# Patient Record
Sex: MALE | Race: OTHER | HISPANIC OR LATINO | URBAN - METROPOLITAN AREA
[De-identification: names, ages, dates, MRNs, and addresses within clinical notes are randomized per-mention and may not be internally consistent; named-entity substitution may affect disease eponyms.]

---

## 2020-03-21 ENCOUNTER — INPATIENT (INPATIENT)
Facility: HOSPITAL | Age: 55
LOS: 10 days | Discharge: HOME | End: 2020-04-01
Attending: HOSPITALIST | Admitting: HOSPITALIST
Payer: MEDICARE

## 2020-03-21 VITALS
DIASTOLIC BLOOD PRESSURE: 52 MMHG | OXYGEN SATURATION: 95 % | RESPIRATION RATE: 20 BRPM | SYSTOLIC BLOOD PRESSURE: 92 MMHG | TEMPERATURE: 99 F | HEART RATE: 113 BPM

## 2020-03-21 DIAGNOSIS — Z02.9 ENCOUNTER FOR ADMINISTRATIVE EXAMINATIONS, UNSPECIFIED: ICD-10-CM

## 2020-03-21 DIAGNOSIS — J06.9 ACUTE UPPER RESPIRATORY INFECTION, UNSPECIFIED: ICD-10-CM

## 2020-03-21 DIAGNOSIS — Z29.9 ENCOUNTER FOR PROPHYLACTIC MEASURES, UNSPECIFIED: ICD-10-CM

## 2020-03-21 DIAGNOSIS — A41.9 SEPSIS, UNSPECIFIED ORGANISM: ICD-10-CM

## 2020-03-21 LAB
ALBUMIN SERPL ELPH-MCNC: 3.8 G/DL — SIGNIFICANT CHANGE UP (ref 3.3–5)
ALP SERPL-CCNC: 77 U/L — SIGNIFICANT CHANGE UP (ref 40–120)
ALT FLD-CCNC: 27 U/L — SIGNIFICANT CHANGE UP (ref 4–41)
ANION GAP SERPL CALC-SCNC: 13 MMO/L — SIGNIFICANT CHANGE UP (ref 7–14)
APPEARANCE UR: CLEAR — SIGNIFICANT CHANGE UP
APTT BLD: 34.6 SEC — SIGNIFICANT CHANGE UP (ref 27.5–36.3)
AST SERPL-CCNC: 31 U/L — SIGNIFICANT CHANGE UP (ref 4–40)
B PERT DNA SPEC QL NAA+PROBE: NOT DETECTED — SIGNIFICANT CHANGE UP
BACTERIA # UR AUTO: NEGATIVE — SIGNIFICANT CHANGE UP
BASE EXCESS BLDV CALC-SCNC: 1.9 MMOL/L — SIGNIFICANT CHANGE UP
BASOPHILS # BLD AUTO: 0.01 K/UL — SIGNIFICANT CHANGE UP (ref 0–0.2)
BASOPHILS NFR BLD AUTO: 0.1 % — SIGNIFICANT CHANGE UP (ref 0–2)
BILIRUB SERPL-MCNC: 0.7 MG/DL — SIGNIFICANT CHANGE UP (ref 0.2–1.2)
BILIRUB UR-MCNC: NEGATIVE — SIGNIFICANT CHANGE UP
BLOOD GAS VENOUS - CREATININE: 0.92 MG/DL — SIGNIFICANT CHANGE UP (ref 0.5–1.3)
BLOOD UR QL VISUAL: SIGNIFICANT CHANGE UP
BUN SERPL-MCNC: 10 MG/DL — SIGNIFICANT CHANGE UP (ref 7–23)
C PNEUM DNA SPEC QL NAA+PROBE: NOT DETECTED — SIGNIFICANT CHANGE UP
CALCIUM SERPL-MCNC: 8.7 MG/DL — SIGNIFICANT CHANGE UP (ref 8.4–10.5)
CHLORIDE BLDV-SCNC: 98 MMOL/L — SIGNIFICANT CHANGE UP (ref 96–108)
CHLORIDE SERPL-SCNC: 97 MMOL/L — LOW (ref 98–107)
CO2 SERPL-SCNC: 24 MMOL/L — SIGNIFICANT CHANGE UP (ref 22–31)
COLOR SPEC: YELLOW — SIGNIFICANT CHANGE UP
CREAT SERPL-MCNC: 0.89 MG/DL — SIGNIFICANT CHANGE UP (ref 0.5–1.3)
D DIMER BLD IA.RAPID-MCNC: 177 NG/ML — SIGNIFICANT CHANGE UP
EOSINOPHIL # BLD AUTO: 0 K/UL — SIGNIFICANT CHANGE UP (ref 0–0.5)
EOSINOPHIL NFR BLD AUTO: 0 % — SIGNIFICANT CHANGE UP (ref 0–6)
ERYTHROCYTE [SEDIMENTATION RATE] IN BLOOD: SIGNIFICANT CHANGE UP MM/HR (ref 1–15)
FLUAV H1 2009 PAND RNA SPEC QL NAA+PROBE: NOT DETECTED — SIGNIFICANT CHANGE UP
FLUAV H1 RNA SPEC QL NAA+PROBE: NOT DETECTED — SIGNIFICANT CHANGE UP
FLUAV H3 RNA SPEC QL NAA+PROBE: NOT DETECTED — SIGNIFICANT CHANGE UP
FLUAV SUBTYP SPEC NAA+PROBE: NOT DETECTED — SIGNIFICANT CHANGE UP
FLUBV RNA SPEC QL NAA+PROBE: NOT DETECTED — SIGNIFICANT CHANGE UP
GAS PNL BLDV: 137 MMOL/L — SIGNIFICANT CHANGE UP (ref 136–146)
GLUCOSE BLDV-MCNC: 107 MG/DL — HIGH (ref 70–99)
GLUCOSE SERPL-MCNC: 110 MG/DL — HIGH (ref 70–99)
GLUCOSE UR-MCNC: NEGATIVE — SIGNIFICANT CHANGE UP
HADV DNA SPEC QL NAA+PROBE: NOT DETECTED — SIGNIFICANT CHANGE UP
HCO3 BLDV-SCNC: 24 MMOL/L — SIGNIFICANT CHANGE UP (ref 20–27)
HCOV PNL SPEC NAA+PROBE: SIGNIFICANT CHANGE UP
HCT VFR BLD CALC: 40.1 % — SIGNIFICANT CHANGE UP (ref 39–50)
HCT VFR BLDV CALC: 42.7 % — SIGNIFICANT CHANGE UP (ref 39–51)
HGB BLD-MCNC: 13.3 G/DL — SIGNIFICANT CHANGE UP (ref 13–17)
HGB BLDV-MCNC: 13.9 G/DL — SIGNIFICANT CHANGE UP (ref 13–17)
HMPV RNA SPEC QL NAA+PROBE: NOT DETECTED — SIGNIFICANT CHANGE UP
HPIV1 RNA SPEC QL NAA+PROBE: NOT DETECTED — SIGNIFICANT CHANGE UP
HPIV2 RNA SPEC QL NAA+PROBE: NOT DETECTED — SIGNIFICANT CHANGE UP
HPIV3 RNA SPEC QL NAA+PROBE: NOT DETECTED — SIGNIFICANT CHANGE UP
HPIV4 RNA SPEC QL NAA+PROBE: NOT DETECTED — SIGNIFICANT CHANGE UP
HYALINE CASTS # UR AUTO: NEGATIVE — SIGNIFICANT CHANGE UP
IMM GRANULOCYTES NFR BLD AUTO: 0.4 % — SIGNIFICANT CHANGE UP (ref 0–1.5)
INR BLD: 1.25 — HIGH (ref 0.88–1.17)
KETONES UR-MCNC: NEGATIVE — SIGNIFICANT CHANGE UP
LACTATE BLDV-MCNC: 1.5 MMOL/L — SIGNIFICANT CHANGE UP (ref 0.5–2)
LEUKOCYTE ESTERASE UR-ACNC: NEGATIVE — SIGNIFICANT CHANGE UP
LYMPHOCYTES # BLD AUTO: 1.12 K/UL — SIGNIFICANT CHANGE UP (ref 1–3.3)
LYMPHOCYTES # BLD AUTO: 15.4 % — SIGNIFICANT CHANGE UP (ref 13–44)
MCHC RBC-ENTMCNC: 31 PG — SIGNIFICANT CHANGE UP (ref 27–34)
MCHC RBC-ENTMCNC: 33.2 % — SIGNIFICANT CHANGE UP (ref 32–36)
MCV RBC AUTO: 93.5 FL — SIGNIFICANT CHANGE UP (ref 80–100)
MONOCYTES # BLD AUTO: 0.23 K/UL — SIGNIFICANT CHANGE UP (ref 0–0.9)
MONOCYTES NFR BLD AUTO: 3.2 % — SIGNIFICANT CHANGE UP (ref 2–14)
NEUTROPHILS # BLD AUTO: 5.86 K/UL — SIGNIFICANT CHANGE UP (ref 1.8–7.4)
NEUTROPHILS NFR BLD AUTO: 80.9 % — HIGH (ref 43–77)
NITRITE UR-MCNC: NEGATIVE — SIGNIFICANT CHANGE UP
NRBC # FLD: 0 K/UL — SIGNIFICANT CHANGE UP (ref 0–0)
PCO2 BLDV: 53 MMHG — HIGH (ref 41–51)
PH BLDV: 7.33 PH — SIGNIFICANT CHANGE UP (ref 7.32–7.43)
PH UR: 6.5 — SIGNIFICANT CHANGE UP (ref 5–8)
PLATELET # BLD AUTO: 212 K/UL — SIGNIFICANT CHANGE UP (ref 150–400)
PMV BLD: 10.2 FL — SIGNIFICANT CHANGE UP (ref 7–13)
PO2 BLDV: < 24 MMHG — LOW (ref 35–40)
POTASSIUM BLDV-SCNC: 3.9 MMOL/L — SIGNIFICANT CHANGE UP (ref 3.4–4.5)
POTASSIUM SERPL-MCNC: 3.9 MMOL/L — SIGNIFICANT CHANGE UP (ref 3.5–5.3)
POTASSIUM SERPL-SCNC: 3.9 MMOL/L — SIGNIFICANT CHANGE UP (ref 3.5–5.3)
PROCALCITONIN SERPL-MCNC: 0.17 NG/ML — HIGH (ref 0.02–0.1)
PROT SERPL-MCNC: 8.1 G/DL — SIGNIFICANT CHANGE UP (ref 6–8.3)
PROT UR-MCNC: 200 — HIGH
PROTHROM AB SERPL-ACNC: 14.4 SEC — HIGH (ref 9.8–13.1)
RBC # BLD: 4.29 M/UL — SIGNIFICANT CHANGE UP (ref 4.2–5.8)
RBC # FLD: 12 % — SIGNIFICANT CHANGE UP (ref 10.3–14.5)
RBC CASTS # UR COMP ASSIST: HIGH (ref 0–?)
RSV RNA SPEC QL NAA+PROBE: NOT DETECTED — SIGNIFICANT CHANGE UP
RV+EV RNA SPEC QL NAA+PROBE: NOT DETECTED — SIGNIFICANT CHANGE UP
SAO2 % BLDV: 23.5 % — LOW (ref 60–85)
SODIUM SERPL-SCNC: 134 MMOL/L — LOW (ref 135–145)
SP GR SPEC: 1.02 — SIGNIFICANT CHANGE UP (ref 1–1.04)
SQUAMOUS # UR AUTO: SIGNIFICANT CHANGE UP
UROBILINOGEN FLD QL: NORMAL — SIGNIFICANT CHANGE UP
WBC # BLD: 7.25 K/UL — SIGNIFICANT CHANGE UP (ref 3.8–10.5)
WBC # FLD AUTO: 7.25 K/UL — SIGNIFICANT CHANGE UP (ref 3.8–10.5)
WBC UR QL: SIGNIFICANT CHANGE UP (ref 0–?)

## 2020-03-21 PROCEDURE — 99223 1ST HOSP IP/OBS HIGH 75: CPT | Mod: GC

## 2020-03-21 PROCEDURE — 71045 X-RAY EXAM CHEST 1 VIEW: CPT | Mod: 26

## 2020-03-21 RX ORDER — SODIUM CHLORIDE 9 MG/ML
2000 INJECTION, SOLUTION INTRAVENOUS ONCE
Refills: 0 | Status: COMPLETED | OUTPATIENT
Start: 2020-03-21 | End: 2020-03-21

## 2020-03-21 RX ORDER — ACETAMINOPHEN 500 MG
650 TABLET ORAL ONCE
Refills: 0 | Status: COMPLETED | OUTPATIENT
Start: 2020-03-21 | End: 2020-03-21

## 2020-03-21 RX ORDER — ACETAMINOPHEN 500 MG
650 TABLET ORAL EVERY 6 HOURS
Refills: 0 | Status: DISCONTINUED | OUTPATIENT
Start: 2020-03-21 | End: 2020-03-29

## 2020-03-21 RX ORDER — SODIUM CHLORIDE 9 MG/ML
1000 INJECTION, SOLUTION INTRAVENOUS ONCE
Refills: 0 | Status: COMPLETED | OUTPATIENT
Start: 2020-03-21 | End: 2020-03-21

## 2020-03-21 RX ORDER — AZITHROMYCIN 500 MG/1
500 TABLET, FILM COATED ORAL DAILY
Refills: 0 | Status: DISCONTINUED | OUTPATIENT
Start: 2020-03-21 | End: 2020-03-22

## 2020-03-21 RX ORDER — ALBUTEROL 90 UG/1
1 AEROSOL, METERED ORAL EVERY 6 HOURS
Refills: 0 | Status: DISCONTINUED | OUTPATIENT
Start: 2020-03-21 | End: 2020-04-01

## 2020-03-21 RX ADMIN — SODIUM CHLORIDE 2000 MILLILITER(S): 9 INJECTION, SOLUTION INTRAVENOUS at 20:32

## 2020-03-21 RX ADMIN — Medication 650 MILLIGRAM(S): at 17:23

## 2020-03-21 RX ADMIN — Medication 650 MILLIGRAM(S): at 18:30

## 2020-03-21 RX ADMIN — SODIUM CHLORIDE 1000 MILLILITER(S): 9 INJECTION, SOLUTION INTRAVENOUS at 17:01

## 2020-03-21 RX ADMIN — SODIUM CHLORIDE 1000 MILLILITER(S): 9 INJECTION, SOLUTION INTRAVENOUS at 13:29

## 2020-03-21 RX ADMIN — AZITHROMYCIN 255 MILLIGRAM(S): 500 TABLET, FILM COATED ORAL at 21:59

## 2020-03-21 NOTE — H&P ADULT - PROBLEM SELECTOR PLAN 1
Patient presenting with tachypnea and fevers with source of URI, initially hypotensive in the ED  - Monitor off antibiotics as source is likely viral URI  - Blood cultures x2  - Vital Signs q 4 hours  - 2 L bolus of IVF Patient presenting with tachypnea and fevers with source of URI, initially hypotensive in the ED  - Start Azithromycin 500 IV for anti-inflammatory properties as well as atypical pneumonia coverage  - Blood cultures x2  - Vital Signs q 4 hours  - 2 L bolus of IVF

## 2020-03-21 NOTE — ED ADULT NURSE REASSESSMENT NOTE - NS ED NURSE REASSESS COMMENT FT1
received pt in bed A and OX 3 in NAD resting comfortably c/o hills, VS sa noted, medicated as per VO with Tylenol, additional bag of IVF LR ongoing by gravity to left AC 20 G flushed well, no SSx of infiltration,  pt with tachypnea, lung sounds clear  B/L diminished in lower lobes.

## 2020-03-21 NOTE — H&P ADULT - NSHPLABSRESULTS_GEN_ALL_CORE
3-21    134<L>  |  97<L>  |  10  ----------------------------<  110<H>  3.9   |  24  |  0.89    Ca    8.7      21 Mar 2020 12:55    TPro  8.1  /  Alb  3.8  /  TBili  0.7  /  DBili  x   /  AST  31  /  ALT  27  /  AlkPhos  77  03-21          PT/INR - ( 21 Mar 2020 12:55 )   PT: 14.4 SEC;   INR: 1.25          PTT - ( 21 Mar 2020 12:55 )  PTT:34.6 SEC              Urinalysis Basic - ( 21 Mar 2020 12:55 )    Color: YELLOW / Appearance: CLEAR / S.017 / pH: 6.5  Gluc: NEGATIVE / Ketone: NEGATIVE  / Bili: NEGATIVE / Urobili: NORMAL   Blood: SMALL / Protein: 200 / Nitrite: NEGATIVE   Leuk Esterase: NEGATIVE / RBC: 6-10 / WBC 3-5   Sq Epi: OCC / Non Sq Epi: x / Bacteria: NEGATIVE                              13.3   7.25  )-----------( 212      ( 21 Mar 2020 12:55 )             40.1     CAPILLARY BLOOD GLUCOSE    EKG showing sinus rhythm no ST Segment changes  CXR showing bilateral opacities

## 2020-03-21 NOTE — ED PROVIDER NOTE - CLINICAL SUMMARY MEDICAL DECISION MAKING FREE TEXT BOX
54m w/ hx depression here for 1 week fever and cough w/ numerous contacts in family w/ similar sx. Pt w low-grade oral temp, tachycardic and tachypneic. C/f pna vs viral/covid-19. Plan for labs, cxr reeval

## 2020-03-21 NOTE — H&P ADULT - PROBLEM SELECTOR PLAN 2
- Albuterol inhaler prn for shortness of breath  - Avoid nebulizing treatments as this can aerosolize infection  - RVP negative. COVID-19 test sent in the ED. f/u results

## 2020-03-21 NOTE — ED PROVIDER NOTE - ATTENDING CONTRIBUTION TO CARE
Dickinson: 54 yom with hx of depression complaining of everyone in family feeling ill for few days to 1 week. +fever, +cough, +SOB and +CP. Entire family has similar sxs. On exam, pt is borderline hypotensive, 90s, not tachy, RR 26, initially hypoxic, crackles diffusely bilaterally, RRR, no edema, not pale, abdomen soft and non tender. CXR shows diffuse significant disease. Given sxs, and vitals and exam, will admit for possible covid19 disease, same as other family.

## 2020-03-21 NOTE — ED ADULT NURSE REASSESSMENT NOTE - NS ED NURSE REASSESS COMMENT FT1
received report from SKYLAR Gould pt aox4 in no apparent distress VSS denies complaints awaiting bed assignment will continue to monitor

## 2020-03-21 NOTE — H&P ADULT - ASSESSMENT
55 yo male with no past med-surg hx admitted to medicine for severe sepsis secondary to an upper respiratory infection concerning for COVID-19.

## 2020-03-21 NOTE — H&P ADULT - PROBLEM SELECTOR PLAN 4
Transitions of Care Status:  1.  Name of PCP:  2.  PCP Contacted on Admission: [ ] Y    [ ] N    3.  PCP contacted at Discharge: [ ] Y    [ ] N    [ ] N/A  4.  Post-Discharge Appointment Date and Location:  5.  Summary of Handoff given to PCP: Transitions of Care Status:  1.  Name of PCP: Unknown  2.  PCP Contacted on Admission: [ ] Y    [ ] N    3.  PCP contacted at Discharge: [ ] Y    [ ] N    [ ] N/A  4.  Post-Discharge Appointment Date and Location:  5.  Summary of Handoff given to PCP:

## 2020-03-21 NOTE — H&P ADULT - PROBLEM SELECTOR PLAN 3
Diet: Regular  DVT Prophylaxis: Holding off on chemoprophylaxis as improve score is Zero  Dispo: Pending resolution of symptoms

## 2020-03-21 NOTE — H&P ADULT - NSHPPHYSICALEXAM_GEN_ALL_CORE
Vital Signs Last 24 Hrs  T(C): 37.7 (21 Mar 2020 19:59), Max: 38.8 (21 Mar 2020 17:01)  T(F): 99.8 (21 Mar 2020 19:59), Max: 101.8 (21 Mar 2020 17:01)  HR: 78 (21 Mar 2020 19:59) (64 - 113)  BP: 100/51 (21 Mar 2020 19:59) (92/52 - 108/51)  BP(mean): --  RR: 20 (21 Mar 2020 19:59) (18 - 27)  SpO2: 96% (21 Mar 2020 19:59) (95% - 96%)    PHYSICAL EXAM:  GENERAL: tachypneic, resting comfortably in bed  HEAD:  Atraumatic, Normocephalic  EYES: EOMI, PERRLA, conjunctiva and sclera clear  NECK: Supple, No JVD  CHEST/LUNG: Clear to auscultation bilaterally; No wheeze  HEART: Regular rate and rhythm; No murmurs, rubs, or gallops  ABDOMEN: Soft, Nontender, Nondistended; Bowel sounds present  EXTREMITIES:  2+ Peripheral Pulses, No clubbing, cyanosis, or edema  PSYCH: AAOx3  NEUROLOGY: non-focal  SKIN: No rashes or lesions

## 2020-03-21 NOTE — ED PROVIDER NOTE - PROGRESS NOTE DETAILS
Elizabeth Goldberger PGY-3: XR compatible w/ COVID. Pt tachypneic and uncomfortable-appearing. Will admit for r/o covid

## 2020-03-21 NOTE — ED ADULT NURSE NOTE - OBJECTIVE STATEMENT
patient alert ox4 came in c/o cough, SOB and fever increasing since 1 week . c/o chest congestion. skin warm and d ry. breathing even and tachypneic. CM shows NSR. labs done as ordered. . meds given as ordered. awaiting results and re eval.

## 2020-03-21 NOTE — ED PROVIDER NOTE - OBJECTIVE STATEMENT
54m w/ hx depression here for 1 week fever and cough. Now also w/ SOB. Denies cp. Multiple family members w/ similar sx. No travel, no known COVID+ contacts.

## 2020-03-21 NOTE — H&P ADULT - ATTENDING COMMENTS
53 y/o male HX of Depression on No meds, admitted for Dry Cough, Fever x one week, + Myalgia, + HA, + SOB, NO CP, No recent travel, + Sick contact, RVP Neg.,  T .8, on 3 lit O2 NC, initially Hypotensive responded to IVF,     Tylenol PRN, Albuterol PRN, IV Zithromax, Venodyne for DVT Prophylaxis, R/O QOVID 19, Pneumonia, F/U CBC, CMP, Cultures , IVF NS @ 75 cc/hr x 24 HR,    Case D/W Pt, HS, Nursing,    Pt was seen by me, Dr. DAVID Joshi on 3/21/2020.

## 2020-03-21 NOTE — H&P ADULT - HISTORY OF PRESENT ILLNESS
Patient is a 55 yo male with no past med-surg history who presents to the ED with cough and fevers. Patient states that he has had these symptoms over the past week. He has noticed myalgias and headaches that have progressively worsened over the past few days. He also has had fevers and chills while at home. Of note, patient endorses that his daughter and wife have had similar symptoms and have all come into the ED today. Patient denies any recent travel and sick contacts. He has been taking Mucinex twice at home, which has not helped much with the cough.    In the ED, his VS were BP were 92/52, , RR 20, T 99.3, RR 95% on RA. His labs were remarkable for a normal WBC count. He had a negative RVP, and a CXR that showed bialteral patchy opacities. He was given a 1 L bolus of Lactated ringers.

## 2020-03-21 NOTE — H&P ADULT - NSHPREVIEWOFSYSTEMS_GEN_ALL_CORE
REVIEW OF SYSTEMS:    CONSTITUTIONAL: +Weakness, + Fevers, + Chills  EYES/ENT: No visual changes;  No vertigo or throat pain   NECK: No pain or stiffness  RESPIRATORY: + Cough; No wheezing, hemoptysis; No shortness of breath  CARDIOVASCULAR: No chest pain or palpitations  GASTROINTESTINAL: No abdominal or epigastric pain. No nausea, vomiting, or hematemesis; No diarrhea or constipation. No melena or hematochezia.  GENITOURINARY: No dysuria, frequency or hematuria  NEUROLOGICAL: No numbness or weakness  SKIN: No itching, rashes

## 2020-03-22 LAB
ALBUMIN SERPL ELPH-MCNC: 3 G/DL — LOW (ref 3.3–5)
ALP SERPL-CCNC: 60 U/L — SIGNIFICANT CHANGE UP (ref 40–120)
ALT FLD-CCNC: 17 U/L — SIGNIFICANT CHANGE UP (ref 4–41)
ANION GAP SERPL CALC-SCNC: 11 MMO/L — SIGNIFICANT CHANGE UP (ref 7–14)
AST SERPL-CCNC: 26 U/L — SIGNIFICANT CHANGE UP (ref 4–40)
BASOPHILS # BLD AUTO: 0.01 K/UL — SIGNIFICANT CHANGE UP (ref 0–0.2)
BASOPHILS NFR BLD AUTO: 0.1 % — SIGNIFICANT CHANGE UP (ref 0–2)
BILIRUB SERPL-MCNC: 0.8 MG/DL — SIGNIFICANT CHANGE UP (ref 0.2–1.2)
BUN SERPL-MCNC: 7 MG/DL — SIGNIFICANT CHANGE UP (ref 7–23)
CALCIUM SERPL-MCNC: 8.2 MG/DL — LOW (ref 8.4–10.5)
CHLORIDE SERPL-SCNC: 102 MMOL/L — SIGNIFICANT CHANGE UP (ref 98–107)
CO2 SERPL-SCNC: 24 MMOL/L — SIGNIFICANT CHANGE UP (ref 22–31)
CREAT SERPL-MCNC: 0.76 MG/DL — SIGNIFICANT CHANGE UP (ref 0.5–1.3)
CULTURE RESULTS: SIGNIFICANT CHANGE UP
EOSINOPHIL # BLD AUTO: 0 K/UL — SIGNIFICANT CHANGE UP (ref 0–0.5)
EOSINOPHIL NFR BLD AUTO: 0 % — SIGNIFICANT CHANGE UP (ref 0–6)
GLUCOSE SERPL-MCNC: 109 MG/DL — HIGH (ref 70–99)
HCT VFR BLD CALC: 33.5 % — LOW (ref 39–50)
HGB BLD-MCNC: 11.4 G/DL — LOW (ref 13–17)
IMM GRANULOCYTES NFR BLD AUTO: 0.5 % — SIGNIFICANT CHANGE UP (ref 0–1.5)
LYMPHOCYTES # BLD AUTO: 1.03 K/UL — SIGNIFICANT CHANGE UP (ref 1–3.3)
LYMPHOCYTES # BLD AUTO: 8.9 % — LOW (ref 13–44)
MAGNESIUM SERPL-MCNC: 1.8 MG/DL — SIGNIFICANT CHANGE UP (ref 1.6–2.6)
MCHC RBC-ENTMCNC: 31.8 PG — SIGNIFICANT CHANGE UP (ref 27–34)
MCHC RBC-ENTMCNC: 34 % — SIGNIFICANT CHANGE UP (ref 32–36)
MCV RBC AUTO: 93.6 FL — SIGNIFICANT CHANGE UP (ref 80–100)
MONOCYTES # BLD AUTO: 0.19 K/UL — SIGNIFICANT CHANGE UP (ref 0–0.9)
MONOCYTES NFR BLD AUTO: 1.6 % — LOW (ref 2–14)
NEUTROPHILS # BLD AUTO: 10.28 K/UL — HIGH (ref 1.8–7.4)
NEUTROPHILS NFR BLD AUTO: 88.9 % — HIGH (ref 43–77)
NRBC # FLD: 0 K/UL — SIGNIFICANT CHANGE UP (ref 0–0)
PHOSPHATE SERPL-MCNC: 2.9 MG/DL — SIGNIFICANT CHANGE UP (ref 2.5–4.5)
PLATELET # BLD AUTO: 217 K/UL — SIGNIFICANT CHANGE UP (ref 150–400)
PMV BLD: 10.5 FL — SIGNIFICANT CHANGE UP (ref 7–13)
POTASSIUM SERPL-MCNC: 4.1 MMOL/L — SIGNIFICANT CHANGE UP (ref 3.5–5.3)
POTASSIUM SERPL-SCNC: 4.1 MMOL/L — SIGNIFICANT CHANGE UP (ref 3.5–5.3)
PROT SERPL-MCNC: 6.6 G/DL — SIGNIFICANT CHANGE UP (ref 6–8.3)
RBC # BLD: 3.58 M/UL — LOW (ref 4.2–5.8)
RBC # FLD: 12.1 % — SIGNIFICANT CHANGE UP (ref 10.3–14.5)
SARS-COV-2 RNA SPEC QL NAA+PROBE: DETECTED
SODIUM SERPL-SCNC: 137 MMOL/L — SIGNIFICANT CHANGE UP (ref 135–145)
SPECIMEN SOURCE: SIGNIFICANT CHANGE UP
WBC # BLD: 11.57 K/UL — HIGH (ref 3.8–10.5)
WBC # FLD AUTO: 11.57 K/UL — HIGH (ref 3.8–10.5)

## 2020-03-22 PROCEDURE — 99233 SBSQ HOSP IP/OBS HIGH 50: CPT

## 2020-03-22 RX ORDER — INFLUENZA VIRUS VACCINE 15; 15; 15; 15 UG/.5ML; UG/.5ML; UG/.5ML; UG/.5ML
0.5 SUSPENSION INTRAMUSCULAR ONCE
Refills: 0 | Status: DISCONTINUED | OUTPATIENT
Start: 2020-03-22 | End: 2020-04-01

## 2020-03-22 RX ORDER — SODIUM CHLORIDE 9 MG/ML
1000 INJECTION INTRAMUSCULAR; INTRAVENOUS; SUBCUTANEOUS
Refills: 0 | Status: DISCONTINUED | OUTPATIENT
Start: 2020-03-22 | End: 2020-03-24

## 2020-03-22 RX ORDER — AZITHROMYCIN 500 MG/1
500 TABLET, FILM COATED ORAL DAILY
Refills: 0 | Status: DISCONTINUED | OUTPATIENT
Start: 2020-03-22 | End: 2020-03-23

## 2020-03-22 RX ADMIN — SODIUM CHLORIDE 75 MILLILITER(S): 9 INJECTION INTRAMUSCULAR; INTRAVENOUS; SUBCUTANEOUS at 07:25

## 2020-03-22 RX ADMIN — AZITHROMYCIN 500 MILLIGRAM(S): 500 TABLET, FILM COATED ORAL at 21:22

## 2020-03-22 RX ADMIN — ALBUTEROL 1 PUFF(S): 90 AEROSOL, METERED ORAL at 01:29

## 2020-03-22 RX ADMIN — SODIUM CHLORIDE 75 MILLILITER(S): 9 INJECTION INTRAMUSCULAR; INTRAVENOUS; SUBCUTANEOUS at 21:23

## 2020-03-22 RX ADMIN — SODIUM CHLORIDE 75 MILLILITER(S): 9 INJECTION INTRAMUSCULAR; INTRAVENOUS; SUBCUTANEOUS at 00:18

## 2020-03-22 RX ADMIN — ALBUTEROL 1 PUFF(S): 90 AEROSOL, METERED ORAL at 12:21

## 2020-03-22 NOTE — PROGRESS NOTE ADULT - PROBLEM SELECTOR PLAN 4
Transitions of Care Status:  1.  Name of PCP: Unknown  2.  PCP Contacted on Admission: [ ] Y    [ ] N    3.  PCP contacted at Discharge: [ ] Y    [ ] N    [ ] N/A  4.  Post-Discharge Appointment Date and Location:  5.  Summary of Handoff given to PCP:

## 2020-03-22 NOTE — PROGRESS NOTE ADULT - PROBLEM SELECTOR PLAN 1
Patient presenting with tachypnea and fevers with source of URI, initially hypotensive in the ED  - Start Azithromycin 500 IV for anti-inflammatory properties as well as atypical pneumonia coverage  - Blood cultures x2  - Vital Signs q 4 hours

## 2020-03-22 NOTE — PROGRESS NOTE ADULT - SUBJECTIVE AND OBJECTIVE BOX
55 yo male with no past med-surg history who presents to the ED with cough and fevers.    patient seen and examine at bed side  still mild SOB and on 4 L oxygen   o2 decrease to 2 L saturating 94 %   covid pending     MEDICATIONS  (STANDING):  azithromycin  IVPB 500 milliGRAM(s) IV Intermittent daily  influenza   Vaccine 0.5 milliLiter(s) IntraMuscular once  sodium chloride 0.9%. 1000 milliLiter(s) (75 mL/Hr) IV Continuous <Continuous>    MEDICATIONS  (PRN):  acetaminophen   Tablet .. 650 milliGRAM(s) Oral every 6 hours PRN Temp greater or equal to 38.5C (101.3F), Mild Pain (1 - 3), Moderate Pain (4 - 6)  ALBUTerol    90 MICROgram(s) HFA Inhaler 1 Puff(s) Inhalation every 6 hours PRN Shortness of Breath      Vital Signs Last 24 Hrs  T(C): 36.9 (22 Mar 2020 15:01), Max: 38.8 (21 Mar 2020 17:01)  T(F): 98.5 (22 Mar 2020 15:01), Max: 101.8 (21 Mar 2020 17:01)  HR: 68 (22 Mar 2020 15:01) (68 - 86)  BP: 93/39 (22 Mar 2020 15:01) (93/39 - 125/61)  BP(mean): --  RR: 18 (22 Mar 2020 15:01) (18 - 27)  SpO2: 96% (22 Mar 2020 15:01) (92% - 100%)      	GENERAL: tachypneic, resting comfortably in bed  	HEAD:  Atraumatic, Normocephalic  	EYES: EOMI, PERRLA, conjunctiva and sclera clear  	NECK: Supple, No JVD  	CHEST/LUNG: Clear to auscultation bilaterally; No wheeze  	HEART: Regular rate and rhythm; No murmurs, rubs, or gallops  	ABDOMEN: Soft, Nontender, Nondistended; Bowel sounds present  	EXTREMITIES:  2+ Peripheral Pulses, No clubbing, cyanosis, or edema  	PSYCH: AAOx3  	NEUROLOGY: non-focal  SKIN: No rashes or lesions                          11.4   11.57 )-----------( 217      ( 22 Mar 2020 07:27 )             33.5       03-22    137  |  102  |  7   ----------------------------<  109<H>  4.1   |  24  |  0.76    Ca    8.2<L>      22 Mar 2020 07:27  Phos  2.9     03-22  Mg     1.8     03-22    TPro  6.6  /  Alb  3.0<L>  /  TBili  0.8  /  DBili  x   /  AST  26  /  ALT  17  /  AlkPhos  60  03-22

## 2020-03-22 NOTE — PROGRESS NOTE ADULT - ASSESSMENT
53 yo male with no past med-surg hx admitted to medicine for severe sepsis secondary to an upper respiratory infection concerning for COVID-19.

## 2020-03-22 NOTE — CHART NOTE - NSCHARTNOTEFT_GEN_A_CORE
Patient signed out to ADS at 4 am on 3/22.  ADS to cover patient from now    Vandana Weaver  PGY-2 Internal Medicine  Pager: [phone] (NS)/[phone] (BALDO)

## 2020-03-23 LAB
ALBUMIN SERPL ELPH-MCNC: 2.9 G/DL — LOW (ref 3.3–5)
ALP SERPL-CCNC: 59 U/L — SIGNIFICANT CHANGE UP (ref 40–120)
ALT FLD-CCNC: 29 U/L — SIGNIFICANT CHANGE UP (ref 4–41)
ANION GAP SERPL CALC-SCNC: 11 MMO/L — SIGNIFICANT CHANGE UP (ref 7–14)
AST SERPL-CCNC: 37 U/L — SIGNIFICANT CHANGE UP (ref 4–40)
BASOPHILS # BLD AUTO: 0.01 K/UL — SIGNIFICANT CHANGE UP (ref 0–0.2)
BASOPHILS NFR BLD AUTO: 0.1 % — SIGNIFICANT CHANGE UP (ref 0–2)
BILIRUB SERPL-MCNC: 0.5 MG/DL — SIGNIFICANT CHANGE UP (ref 0.2–1.2)
BUN SERPL-MCNC: 9 MG/DL — SIGNIFICANT CHANGE UP (ref 7–23)
CALCIUM SERPL-MCNC: 8.2 MG/DL — LOW (ref 8.4–10.5)
CHLORIDE SERPL-SCNC: 104 MMOL/L — SIGNIFICANT CHANGE UP (ref 98–107)
CO2 SERPL-SCNC: 23 MMOL/L — SIGNIFICANT CHANGE UP (ref 22–31)
CREAT SERPL-MCNC: 0.74 MG/DL — SIGNIFICANT CHANGE UP (ref 0.5–1.3)
EOSINOPHIL # BLD AUTO: 0.04 K/UL — SIGNIFICANT CHANGE UP (ref 0–0.5)
EOSINOPHIL NFR BLD AUTO: 0.5 % — SIGNIFICANT CHANGE UP (ref 0–6)
GLUCOSE SERPL-MCNC: 104 MG/DL — HIGH (ref 70–99)
HCT VFR BLD CALC: 33.3 % — LOW (ref 39–50)
HGB BLD-MCNC: 11.3 G/DL — LOW (ref 13–17)
IMM GRANULOCYTES NFR BLD AUTO: 0.5 % — SIGNIFICANT CHANGE UP (ref 0–1.5)
LYMPHOCYTES # BLD AUTO: 1.43 K/UL — SIGNIFICANT CHANGE UP (ref 1–3.3)
LYMPHOCYTES # BLD AUTO: 17 % — SIGNIFICANT CHANGE UP (ref 13–44)
MAGNESIUM SERPL-MCNC: 1.9 MG/DL — SIGNIFICANT CHANGE UP (ref 1.6–2.6)
MCHC RBC-ENTMCNC: 31.7 PG — SIGNIFICANT CHANGE UP (ref 27–34)
MCHC RBC-ENTMCNC: 33.9 % — SIGNIFICANT CHANGE UP (ref 32–36)
MCV RBC AUTO: 93.5 FL — SIGNIFICANT CHANGE UP (ref 80–100)
MONOCYTES # BLD AUTO: 0.26 K/UL — SIGNIFICANT CHANGE UP (ref 0–0.9)
MONOCYTES NFR BLD AUTO: 3.1 % — SIGNIFICANT CHANGE UP (ref 2–14)
NEUTROPHILS # BLD AUTO: 6.63 K/UL — SIGNIFICANT CHANGE UP (ref 1.8–7.4)
NEUTROPHILS NFR BLD AUTO: 78.8 % — HIGH (ref 43–77)
NRBC # FLD: 0 K/UL — SIGNIFICANT CHANGE UP (ref 0–0)
PHOSPHATE SERPL-MCNC: 2.8 MG/DL — SIGNIFICANT CHANGE UP (ref 2.5–4.5)
PLATELET # BLD AUTO: 243 K/UL — SIGNIFICANT CHANGE UP (ref 150–400)
PMV BLD: 10.5 FL — SIGNIFICANT CHANGE UP (ref 7–13)
POTASSIUM SERPL-MCNC: 3.8 MMOL/L — SIGNIFICANT CHANGE UP (ref 3.5–5.3)
POTASSIUM SERPL-SCNC: 3.8 MMOL/L — SIGNIFICANT CHANGE UP (ref 3.5–5.3)
PROT SERPL-MCNC: 6.7 G/DL — SIGNIFICANT CHANGE UP (ref 6–8.3)
RBC # BLD: 3.56 M/UL — LOW (ref 4.2–5.8)
RBC # FLD: 12.3 % — SIGNIFICANT CHANGE UP (ref 10.3–14.5)
SODIUM SERPL-SCNC: 138 MMOL/L — SIGNIFICANT CHANGE UP (ref 135–145)
WBC # BLD: 8.41 K/UL — SIGNIFICANT CHANGE UP (ref 3.8–10.5)
WBC # FLD AUTO: 8.41 K/UL — SIGNIFICANT CHANGE UP (ref 3.8–10.5)

## 2020-03-23 PROCEDURE — 93010 ELECTROCARDIOGRAM REPORT: CPT

## 2020-03-23 PROCEDURE — 99233 SBSQ HOSP IP/OBS HIGH 50: CPT

## 2020-03-23 RX ORDER — HYDROXYCHLOROQUINE SULFATE 200 MG
400 TABLET ORAL EVERY 12 HOURS
Refills: 0 | Status: COMPLETED | OUTPATIENT
Start: 2020-03-23 | End: 2020-03-24

## 2020-03-23 RX ORDER — HYDROXYCHLOROQUINE SULFATE 200 MG
200 TABLET ORAL EVERY 12 HOURS
Refills: 0 | Status: COMPLETED | OUTPATIENT
Start: 2020-03-24 | End: 2020-03-28

## 2020-03-23 RX ORDER — HYDROXYCHLOROQUINE SULFATE 200 MG
TABLET ORAL
Refills: 0 | Status: COMPLETED | OUTPATIENT
Start: 2020-03-23 | End: 2020-03-28

## 2020-03-23 RX ADMIN — Medication 400 MILLIGRAM(S): at 17:53

## 2020-03-23 NOTE — PROGRESS NOTE ADULT - PROBLEM SELECTOR PLAN 2
- COVID19 positive   - Albuterol inhaler prn for shortness of breath  - Avoid nebulizing treatments as this can aerosolize infection  - RVP negative.

## 2020-03-23 NOTE — PROGRESS NOTE ADULT - PROBLEM SELECTOR PLAN 1
- Patient presenting with tachypnea and fevers 2/2 COVID19   - DC azithromycin, start hydroxycloroquine given escalating O2 requirements   - Blood cultures x2 negative  - UA/RVP negative, CXR with bilateral patchy opacities

## 2020-03-23 NOTE — PROGRESS NOTE ADULT - SUBJECTIVE AND OBJECTIVE BOX
[facility] Division of Hospital Medicine  Ralph Jesus DO  Pager (RYAN, 8A-5P): [phone]    Patient is a 54y old  Male who presents with a chief complaint of Fevers and Cough (22 Mar 2020 15:58)    SUBJECTIVE / OVERNIGHT EVENTS: Pt feels fatigued, cough has improved, but still with some dry cough. Remains afebrile for past 24 hours.  Pt able to speak in full sentences, but has intermittent SOB throughout his sentences.     MEDICATIONS  (STANDING):  azithromycin   Tablet 500 milliGRAM(s) Oral daily  influenza   Vaccine 0.5 milliLiter(s) IntraMuscular once  sodium chloride 0.9%. 1000 milliLiter(s) (75 mL/Hr) IV Continuous <Continuous>    MEDICATIONS  (PRN):  acetaminophen   Tablet .. 650 milliGRAM(s) Oral every 6 hours PRN Temp greater or equal to 38.5C (101.3F), Mild Pain (1 - 3), Moderate Pain (4 - 6)  ALBUTerol    90 MICROgram(s) HFA Inhaler 1 Puff(s) Inhalation every 6 hours PRN Shortness of Breath      CAPILLARY BLOOD GLUCOSE        I&O's Summary    23 Mar 2020 07:01  -  23 Mar 2020 13:53  --------------------------------------------------------  IN: 0 mL / OUT: 0 mL / NET: 0 mL    PHYSICAL EXAM:  Vital Signs Last 24 Hrs  T(C): 36.9 (23 Mar 2020 10:44), Max: 37.3 (23 Mar 2020 01:48)  T(F): 98.4 (23 Mar 2020 10:44), Max: 99.1 (23 Mar 2020 01:48)  HR: 51 (23 Mar 2020 10:44) (51 - 78)  BP: 100/53 (23 Mar 2020 10:44) (91/61 - 112/51)  BP(mean): --  RR: 19 (23 Mar 2020 10:44) (18 - 20)  SpO2: 94% (23 Mar 2020 10:44) (94% - 96%)    CONSTITUTIONAL: Mild distress 2/2 SOB   EYES: PERRLA; conjunctiva and sclera clear  RESPIRATORY: Normal respiratory effort; lungs are clear to auscultation bilaterally  CARDIOVASCULAR: Regular rate and rhythm, normal S1 and S2, no murmur/rub/gallop; No lower extremity edema  ABDOMEN: Nontender to palpation, normoactive bowel sounds, no rebound/guarding  MUSCULOSKELETAL:  No clubbing or cyanosis of digits; no joint swelling or tenderness to palpation  PSYCH: A+O to person, place, and time; affect appropriate  NEUROLOGY: CN 2-12 are intact and symmetric; no gross sensory deficits;   SKIN: No rashes; no palpable lesions    LABS:                        11.3   8.41  )-----------( 243      ( 23 Mar 2020 05:31 )             33.3     03-23    138  |  104  |  9   ----------------------------<  104<H>  3.8   |  23  |  0.74    Ca    8.2<L>      23 Mar 2020 05:31  Phos  2.8     03-23  Mg     1.9     03-23    TPro  6.7  /  Alb  2.9<L>  /  TBili  0.5  /  DBili  x   /  AST  37  /  ALT  29  /  AlkPhos  59  03-23    Culture - Blood (collected 21 Mar 2020 18:39)  Source: .Blood Blood-Peripheral  Preliminary Report (22 Mar 2020 19:01):    No growth to date.    Culture - Blood (collected 21 Mar 2020 18:39)  Source: .Blood Blood-Peripheral  Preliminary Report (22 Mar 2020 19:01):    No growth to date.    Culture - Urine (collected 21 Mar 2020 13:00)  Source: .Urine Clean Catch (Midstream)  Final Report (22 Mar 2020 14:11):    <10,000 CFU/mL Normal Urogenital Maylin    Rapid Respiratory Viral Panel (03.21.20 @ 12:55)    Adenovirus (RapRVP): Not Detected    Influenza A (RapRVP): Not Detected    Influenza AH1 2009 (RapRVP): Not Detected    Influenza AH1 (RapRVP): Not Detected    Influenza AH3 (RapRVP): Not Detected    Influenza B (RapRVP): Not Detected    Parainfluenza 1 (RapRVP): Not Detected    Parainfluenza 2 (RapRVP): Not Detected    Parainfluenza 3 (RapRVP): Not Detected    Parainfluenza 4 (RapRVP): Not Detected    Resp Syncytial Virus (RapRVP): Not Detected    Chlamydia pneumoniae (RapRVP): Not Detected    Mycoplasma pneumoniae (RapRVP): Not Detected    Entero/Rhinovirus (RapRVP): Not Detected    hMPV (RapRVP): Not Detected    Coronavirus (229E,HKU1,NL63,OC43): Not Detected This Respiratory Panel uses polymerase chain reaction (PCR)  to detect for adenovirus; coronavirus (HKU1, NL63, 229E,  OC43); human metapneumovirus (hMPV); human  enterovirus/rhinovirus (Entero/RV); influenza A; influenza  A/H1: influenza A/H3; influenza A/H1-2009; influenza B;  parainfluenza viruses 1,2,3,4; respiratory syncytial virus;  Mycoplasma pneumoniae; and Chlamydophila pneumoniae.    Note: This assay does not detect the novel 2019 coronavirus.  Positive coronavirus results using this assay confirm  infection with the classic human coronaviruses associated  with respiratory infections.    Blood Gas Venous Comprehensive (03.21.20 @ 12:55)    Blood Gas Venous - Lactate: 1.5: Please note updated reference range. mmol/L    Blood Gas Venous - Chloride: 98 mmol/L    Blood Gas Venous - Creatinine: 0.92 mg/dL    pH, Venous: 7.33 pH    pCO2, Venous: 53 mmHg    pO2, Venous: < 24 mmHg    HCO3, Venous: 24 mmol/L    Base Excess, Venous: 1.9: REFERENCE RANGE = -3 + 2 mmol/L mmol/L    Oxygen Saturation, Venous: 23.5 %    Blood Gas Venous - Sodium: 137 mmol/L    Blood Gas Venous - Potassium: 3.9 mmol/L    Blood Gas Venous - Glucose: 107 mg/dL    Blood Gas Venous - Hemoglobin: 13.9 g/dL    Blood Gas Venous - Hematocrit: 42.7 %        RADIOLOGY & ADDITIONAL TESTS:  Results Reviewed:   < from: Xray Chest 1 View AP/PA (03.21.20 @ 13:53) >    IMPRESSION:     Bilateral patchy opacities may be infectious    < end of copied text >

## 2020-03-24 LAB
ALBUMIN SERPL ELPH-MCNC: 2.9 G/DL — LOW (ref 3.3–5)
ALP SERPL-CCNC: 66 U/L — SIGNIFICANT CHANGE UP (ref 40–120)
ALT FLD-CCNC: 114 U/L — HIGH (ref 4–41)
ANION GAP SERPL CALC-SCNC: 13 MMO/L — SIGNIFICANT CHANGE UP (ref 7–14)
AST SERPL-CCNC: 103 U/L — HIGH (ref 4–40)
BASOPHILS # BLD AUTO: 0.01 K/UL — SIGNIFICANT CHANGE UP (ref 0–0.2)
BASOPHILS NFR BLD AUTO: 0.2 % — SIGNIFICANT CHANGE UP (ref 0–2)
BILIRUB SERPL-MCNC: 0.7 MG/DL — SIGNIFICANT CHANGE UP (ref 0.2–1.2)
BUN SERPL-MCNC: 8 MG/DL — SIGNIFICANT CHANGE UP (ref 7–23)
CALCIUM SERPL-MCNC: 8.5 MG/DL — SIGNIFICANT CHANGE UP (ref 8.4–10.5)
CHLORIDE SERPL-SCNC: 101 MMOL/L — SIGNIFICANT CHANGE UP (ref 98–107)
CO2 SERPL-SCNC: 23 MMOL/L — SIGNIFICANT CHANGE UP (ref 22–31)
CREAT SERPL-MCNC: 0.57 MG/DL — SIGNIFICANT CHANGE UP (ref 0.5–1.3)
EOSINOPHIL # BLD AUTO: 0.05 K/UL — SIGNIFICANT CHANGE UP (ref 0–0.5)
EOSINOPHIL NFR BLD AUTO: 0.8 % — SIGNIFICANT CHANGE UP (ref 0–6)
GLUCOSE SERPL-MCNC: 121 MG/DL — HIGH (ref 70–99)
HCT VFR BLD CALC: 35.8 % — LOW (ref 39–50)
HGB BLD-MCNC: 11.6 G/DL — LOW (ref 13–17)
IMM GRANULOCYTES NFR BLD AUTO: 0.5 % — SIGNIFICANT CHANGE UP (ref 0–1.5)
LYMPHOCYTES # BLD AUTO: 1.02 K/UL — SIGNIFICANT CHANGE UP (ref 1–3.3)
LYMPHOCYTES # BLD AUTO: 15.5 % — SIGNIFICANT CHANGE UP (ref 13–44)
MAGNESIUM SERPL-MCNC: 2.1 MG/DL — SIGNIFICANT CHANGE UP (ref 1.6–2.6)
MCHC RBC-ENTMCNC: 31.3 PG — SIGNIFICANT CHANGE UP (ref 27–34)
MCHC RBC-ENTMCNC: 32.4 % — SIGNIFICANT CHANGE UP (ref 32–36)
MCV RBC AUTO: 96.5 FL — SIGNIFICANT CHANGE UP (ref 80–100)
MONOCYTES # BLD AUTO: 0.4 K/UL — SIGNIFICANT CHANGE UP (ref 0–0.9)
MONOCYTES NFR BLD AUTO: 6.1 % — SIGNIFICANT CHANGE UP (ref 2–14)
NEUTROPHILS # BLD AUTO: 5.08 K/UL — SIGNIFICANT CHANGE UP (ref 1.8–7.4)
NEUTROPHILS NFR BLD AUTO: 76.9 % — SIGNIFICANT CHANGE UP (ref 43–77)
NRBC # FLD: 0 K/UL — SIGNIFICANT CHANGE UP (ref 0–0)
PHOSPHATE SERPL-MCNC: 3.9 MG/DL — SIGNIFICANT CHANGE UP (ref 2.5–4.5)
PLATELET # BLD AUTO: 270 K/UL — SIGNIFICANT CHANGE UP (ref 150–400)
PMV BLD: 10.2 FL — SIGNIFICANT CHANGE UP (ref 7–13)
POTASSIUM SERPL-MCNC: 3.8 MMOL/L — SIGNIFICANT CHANGE UP (ref 3.5–5.3)
POTASSIUM SERPL-SCNC: 3.8 MMOL/L — SIGNIFICANT CHANGE UP (ref 3.5–5.3)
PROT SERPL-MCNC: 6.8 G/DL — SIGNIFICANT CHANGE UP (ref 6–8.3)
RBC # BLD: 3.71 M/UL — LOW (ref 4.2–5.8)
RBC # FLD: 12.2 % — SIGNIFICANT CHANGE UP (ref 10.3–14.5)
SODIUM SERPL-SCNC: 137 MMOL/L — SIGNIFICANT CHANGE UP (ref 135–145)
WBC # BLD: 6.59 K/UL — SIGNIFICANT CHANGE UP (ref 3.8–10.5)
WBC # FLD AUTO: 6.59 K/UL — SIGNIFICANT CHANGE UP (ref 3.8–10.5)

## 2020-03-24 PROCEDURE — 99233 SBSQ HOSP IP/OBS HIGH 50: CPT

## 2020-03-24 RX ADMIN — Medication 200 MILLIGRAM(S): at 14:11

## 2020-03-24 RX ADMIN — Medication 400 MILLIGRAM(S): at 02:09

## 2020-03-24 NOTE — CHART NOTE - NSCHARTNOTEFT_GEN_A_CORE
ID Yvette [id number]  Pt's wife and eldest daughter (21 year old) are both hospitalized at [facility] with COVID.   I informed the patient that his wife had severe respiratory distress and was intubated today.  He expressed understanding and all questions were answered.  I also informed him that his daughter at this time is doing well on nasal cannula.    I discussed code status with him as well, and he expressed that he would prefer to be intubated if it turned into an emergency.  He was able to appoint his eldest son (Jaren Izquierdo age 23 years old) as primary surrogate in emergency decision making. He also requested that his sister in law (Demetria Mathews) be informed as well in any decision making.   Jaren Izquierdo: [phone]  Demetria Mathews: [phone]

## 2020-03-24 NOTE — PROGRESS NOTE ADULT - PROBLEM SELECTOR PLAN 2
- COVID19 positive   - Albuterol inhaler prn for shortness of breath  - Avoid nebulizing treatments as this can aerosolize infection  - RVP negative  - Escalated to NRB mask for enhanced comfort, pt was saturating well on 5L NC

## 2020-03-24 NOTE — PROGRESS NOTE ADULT - PROBLEM SELECTOR PLAN 1
- Patient presenting with tachypnea and fevers 2/2 COVID19   - Started hydroxycloroquine 3/23 given escalating O2 requirements   - Blood cultures x2 negative  - UA/RVP negative, CXR with bilateral patchy opacities

## 2020-03-24 NOTE — PROGRESS NOTE ADULT - SUBJECTIVE AND OBJECTIVE BOX
[facility] Division of Hospital Medicine  Ralph Jesus DO  Pager (RYAN, 8A-5P): [phone]    Patient is a 54y old  Male who presents with a chief complaint of Fevers and Cough (23 Mar 2020 13:52)    SUBJECTIVE / OVERNIGHT EVENTS: Pt feels very SOB, unable to speak in full sentences. States that whenever he takes his nasal cannula off, he feels very ill and SOB.  Denies CP, denies N/V/D, has some appetite.       MEDICATIONS  (STANDING):  hydroxychloroquine   Oral   hydroxychloroquine 200 milliGRAM(s) Oral every 12 hours  influenza   Vaccine 0.5 milliLiter(s) IntraMuscular once    MEDICATIONS  (PRN):  acetaminophen   Tablet .. 650 milliGRAM(s) Oral every 6 hours PRN Temp greater or equal to 38.5C (101.3F), Mild Pain (1 - 3), Moderate Pain (4 - 6)  ALBUTerol    90 MICROgram(s) HFA Inhaler 1 Puff(s) Inhalation every 6 hours PRN Shortness of Breath      CAPILLARY BLOOD GLUCOSE        I&O's Summary    23 Mar 2020 07:01  -  24 Mar 2020 07:00  --------------------------------------------------------  IN: 0 mL / OUT: 0 mL / NET: 0 mL        PHYSICAL EXAM:  Vital Signs Last 24 Hrs  T(C): 36.6 (24 Mar 2020 14:10), Max: 37.2 (23 Mar 2020 22:01)  T(F): 97.9 (24 Mar 2020 14:10), Max: 99 (23 Mar 2020 22:01)  HR: 59 (24 Mar 2020 14:10) (59 - 67)  BP: 100/47 (24 Mar 2020 14:10) (99/48 - 116/55)  BP(mean): --  RR: 20 (24 Mar 2020 14:10) (17 - 20)  SpO2: 100% (24 Mar 2020 14:10) (93% - 100%)    CONSTITUTIONAL: Moderate respiratory distress   EYES: PERRLA; conjunctiva and sclera clear  RESPIRATORY: Tachypneic, lungs are clear to auscultation bilaterally, no wheezing   CARDIOVASCULAR: Regular rate and rhythm, normal S1 and S2, no murmur/rub/gallop; No lower extremity edema  ABDOMEN: Nontender to palpation, normoactive bowel sounds, no rebound/guarding  MUSCULOSKELETAL:  No clubbing or cyanosis of digits; no joint swelling or tenderness to palpation  PSYCH: A+O to person, place, and time; affect appropriate  NEUROLOGY: CN 2-12 are intact and symmetric; no gross sensory deficits;   SKIN: No rashes; no palpable lesions    LABS:                        11.6   6.59  )-----------( 270      ( 24 Mar 2020 05:28 )             35.8     03-24    137  |  101  |  8   ----------------------------<  121<H>  3.8   |  23  |  0.57    Ca    8.5      24 Mar 2020 05:28  Phos  3.9     03-24  Mg     2.1     03-24    TPro  6.8  /  Alb  2.9<L>  /  TBili  0.7  /  DBili  x   /  AST  103<H>  /  ALT  114<H>  /  AlkPhos  66  03-24      Culture - Blood (collected 21 Mar 2020 18:39)  Source: .Blood Blood-Peripheral  Preliminary Report (22 Mar 2020 19:01):    No growth to date.    Culture - Blood (collected 21 Mar 2020 18:39)  Source: .Blood Blood-Peripheral  Preliminary Report (22 Mar 2020 19:01):    No growth to date.    COVID-19 PCR . (03.21.20 @ 16:40)    COVID-19 PCR: Detected: LDT - Laboratory Developed Test All “detected” results on this new test  are considered presumptively positive results, are clinically actionable,  and specimens will be forwarded to [facility] for confirmation testing.  Another report (corrected report) will only be issued if discordant  results occur.  This test has been validated by [employer] to be accurate;  though it has not been FDA cleared/approved by the usual pathway.  As with all laboratory tests, results should be correlated with clinical  findings.        RADIOLOGY & ADDITIONAL TESTS:  Results Reviewed:   < from: Xray Chest 1 View AP/PA (03.21.20 @ 13:53) >  IMPRESSION:     Bilateral patchy opacities may be infectious

## 2020-03-25 DIAGNOSIS — R06.4 HYPERVENTILATION: ICD-10-CM

## 2020-03-25 DIAGNOSIS — I95.9 HYPOTENSION, UNSPECIFIED: ICD-10-CM

## 2020-03-25 DIAGNOSIS — Z51.5 ENCOUNTER FOR PALLIATIVE CARE: ICD-10-CM

## 2020-03-25 DIAGNOSIS — Z71.89 OTHER SPECIFIED COUNSELING: ICD-10-CM

## 2020-03-25 LAB
ALBUMIN SERPL ELPH-MCNC: 2.7 G/DL — LOW (ref 3.3–5)
ALP SERPL-CCNC: 95 U/L — SIGNIFICANT CHANGE UP (ref 40–120)
ALT FLD-CCNC: 177 U/L — HIGH (ref 4–41)
ANION GAP SERPL CALC-SCNC: 13 MMO/L — SIGNIFICANT CHANGE UP (ref 7–14)
AST SERPL-CCNC: 104 U/L — HIGH (ref 4–40)
BASOPHILS # BLD AUTO: 0.01 K/UL — SIGNIFICANT CHANGE UP (ref 0–0.2)
BASOPHILS NFR BLD AUTO: 0.1 % — SIGNIFICANT CHANGE UP (ref 0–2)
BILIRUB SERPL-MCNC: 1.3 MG/DL — HIGH (ref 0.2–1.2)
BUN SERPL-MCNC: 13 MG/DL — SIGNIFICANT CHANGE UP (ref 7–23)
CALCIUM SERPL-MCNC: 8.4 MG/DL — SIGNIFICANT CHANGE UP (ref 8.4–10.5)
CHLORIDE SERPL-SCNC: 99 MMOL/L — SIGNIFICANT CHANGE UP (ref 98–107)
CO2 SERPL-SCNC: 24 MMOL/L — SIGNIFICANT CHANGE UP (ref 22–31)
CREAT SERPL-MCNC: 0.71 MG/DL — SIGNIFICANT CHANGE UP (ref 0.5–1.3)
EOSINOPHIL # BLD AUTO: 0.05 K/UL — SIGNIFICANT CHANGE UP (ref 0–0.5)
EOSINOPHIL NFR BLD AUTO: 0.6 % — SIGNIFICANT CHANGE UP (ref 0–6)
GLUCOSE SERPL-MCNC: 108 MG/DL — HIGH (ref 70–99)
HCT VFR BLD CALC: 34.3 % — LOW (ref 39–50)
HGB BLD-MCNC: 11.5 G/DL — LOW (ref 13–17)
IMM GRANULOCYTES NFR BLD AUTO: 0.5 % — SIGNIFICANT CHANGE UP (ref 0–1.5)
LYMPHOCYTES # BLD AUTO: 0.88 K/UL — LOW (ref 1–3.3)
LYMPHOCYTES # BLD AUTO: 11.3 % — LOW (ref 13–44)
MAGNESIUM SERPL-MCNC: 2 MG/DL — SIGNIFICANT CHANGE UP (ref 1.6–2.6)
MCHC RBC-ENTMCNC: 30.8 PG — SIGNIFICANT CHANGE UP (ref 27–34)
MCHC RBC-ENTMCNC: 33.5 % — SIGNIFICANT CHANGE UP (ref 32–36)
MCV RBC AUTO: 92 FL — SIGNIFICANT CHANGE UP (ref 80–100)
MONOCYTES # BLD AUTO: 0.48 K/UL — SIGNIFICANT CHANGE UP (ref 0–0.9)
MONOCYTES NFR BLD AUTO: 6.1 % — SIGNIFICANT CHANGE UP (ref 2–14)
NEUTROPHILS # BLD AUTO: 6.35 K/UL — SIGNIFICANT CHANGE UP (ref 1.8–7.4)
NEUTROPHILS NFR BLD AUTO: 81.4 % — HIGH (ref 43–77)
NRBC # FLD: 0 K/UL — SIGNIFICANT CHANGE UP (ref 0–0)
PHOSPHATE SERPL-MCNC: 4 MG/DL — SIGNIFICANT CHANGE UP (ref 2.5–4.5)
PLATELET # BLD AUTO: 314 K/UL — SIGNIFICANT CHANGE UP (ref 150–400)
PMV BLD: 10 FL — SIGNIFICANT CHANGE UP (ref 7–13)
POTASSIUM SERPL-MCNC: 4 MMOL/L — SIGNIFICANT CHANGE UP (ref 3.5–5.3)
POTASSIUM SERPL-SCNC: 4 MMOL/L — SIGNIFICANT CHANGE UP (ref 3.5–5.3)
PROT SERPL-MCNC: 6.9 G/DL — SIGNIFICANT CHANGE UP (ref 6–8.3)
RBC # BLD: 3.73 M/UL — LOW (ref 4.2–5.8)
RBC # FLD: 12.1 % — SIGNIFICANT CHANGE UP (ref 10.3–14.5)
SODIUM SERPL-SCNC: 136 MMOL/L — SIGNIFICANT CHANGE UP (ref 135–145)
WBC # BLD: 7.81 K/UL — SIGNIFICANT CHANGE UP (ref 3.8–10.5)
WBC # FLD AUTO: 7.81 K/UL — SIGNIFICANT CHANGE UP (ref 3.8–10.5)

## 2020-03-25 PROCEDURE — 99223 1ST HOSP IP/OBS HIGH 75: CPT

## 2020-03-25 PROCEDURE — 99497 ADVNCD CARE PLAN 30 MIN: CPT | Mod: 25

## 2020-03-25 PROCEDURE — 93010 ELECTROCARDIOGRAM REPORT: CPT

## 2020-03-25 PROCEDURE — 99233 SBSQ HOSP IP/OBS HIGH 50: CPT

## 2020-03-25 RX ORDER — ROBINUL 0.2 MG/ML
0.2 INJECTION INTRAMUSCULAR; INTRAVENOUS EVERY 8 HOURS
Refills: 0 | Status: DISCONTINUED | OUTPATIENT
Start: 2020-03-25 | End: 2020-03-29

## 2020-03-25 RX ORDER — MIDODRINE HYDROCHLORIDE 2.5 MG/1
5 TABLET ORAL THREE TIMES A DAY
Refills: 0 | Status: DISCONTINUED | OUTPATIENT
Start: 2020-03-25 | End: 2020-03-26

## 2020-03-25 RX ORDER — MORPHINE SULFATE 50 MG/1
1 CAPSULE, EXTENDED RELEASE ORAL ONCE
Refills: 0 | Status: DISCONTINUED | OUTPATIENT
Start: 2020-03-25 | End: 2020-03-25

## 2020-03-25 RX ORDER — MORPHINE SULFATE 50 MG/1
1 CAPSULE, EXTENDED RELEASE ORAL EVERY 4 HOURS
Refills: 0 | Status: DISCONTINUED | OUTPATIENT
Start: 2020-03-25 | End: 2020-03-25

## 2020-03-25 RX ORDER — MORPHINE SULFATE 50 MG/1
1 CAPSULE, EXTENDED RELEASE ORAL EVERY 6 HOURS
Refills: 0 | Status: DISCONTINUED | OUTPATIENT
Start: 2020-03-25 | End: 2020-03-25

## 2020-03-25 RX ADMIN — Medication 200 MILLIGRAM(S): at 04:09

## 2020-03-25 RX ADMIN — Medication 650 MILLIGRAM(S): at 13:00

## 2020-03-25 RX ADMIN — Medication 650 MILLIGRAM(S): at 14:00

## 2020-03-25 RX ADMIN — MIDODRINE HYDROCHLORIDE 5 MILLIGRAM(S): 2.5 TABLET ORAL at 17:48

## 2020-03-25 RX ADMIN — MORPHINE SULFATE 1 MILLIGRAM(S): 50 CAPSULE, EXTENDED RELEASE ORAL at 15:12

## 2020-03-25 RX ADMIN — Medication 650 MILLIGRAM(S): at 23:02

## 2020-03-25 RX ADMIN — Medication 200 MILLIGRAM(S): at 15:12

## 2020-03-25 NOTE — PROGRESS NOTE ADULT - PROBLEM SELECTOR PLAN 1
- Patient presenting with tachypnea and fevers 2/2 COVID19. Pt now afebrile for past 24 hours  - Started hydroxycloroquine 3/23 given escalating O2 requirements, check repeat EKG today to ensure stability in QTc   - Blood cultures x2 negative  - UA/RVP negative, CXR with bilateral patchy opacities consistent with COVID infection

## 2020-03-25 NOTE — CONSULT NOTE ADULT - ASSESSMENT
54M with no past med-surg hx admitted to medicine for severe sepsis secondary to COVID19 54M with no past med-surg hx admitted to medicine for severe sepsis secondary to COVID19.

## 2020-03-25 NOTE — CONSULT NOTE ADULT - PROBLEM SELECTOR RECOMMENDATION 9
- COVID19 positive   - Albuterol inhaler prn for shortness of breath  - Escalated to NRB mask for enhanced comfort, pt was saturating well on 5L NC.

## 2020-03-25 NOTE — PROGRESS NOTE ADULT - SUBJECTIVE AND OBJECTIVE BOX
[facility] Division of Hospital Medicine  Ralph Jesus DO  Pager (RYAN, 8A-5P): [phone]    Patient is a 54y old  Male who presents with a chief complaint of Fevers and Cough (25 Mar 2020 10:39)    SUBJECTIVE / OVERNIGHT EVENTS: Pt remains very short of breath, got weaned down to 6L NC and is saturating 94-95%.  Continues to have dry cough and is having pleuritic chest pain.     MEDICATIONS  (STANDING):  hydroxychloroquine   Oral   hydroxychloroquine 200 milliGRAM(s) Oral every 12 hours  influenza   Vaccine 0.5 milliLiter(s) IntraMuscular once    MEDICATIONS  (PRN):  acetaminophen   Tablet .. 650 milliGRAM(s) Oral every 6 hours PRN Temp greater or equal to 38.5C (101.3F), Mild Pain (1 - 3), Moderate Pain (4 - 6)  ALBUTerol    90 MICROgram(s) HFA Inhaler 1 Puff(s) Inhalation every 6 hours PRN Shortness of Breath      CAPILLARY BLOOD GLUCOSE  I&O's Summary    25 Mar 2020 07:01  -  25 Mar 2020 12:56  --------------------------------------------------------  IN: 200 mL / OUT: 0 mL / NET: 200 mL    PHYSICAL EXAM:  Vital Signs Last 24 Hrs  T(C): 36.9 (25 Mar 2020 10:23), Max: 37.5 (24 Mar 2020 21:00)  T(F): 98.4 (25 Mar 2020 10:23), Max: 99.5 (24 Mar 2020 21:00)  HR: 64 (25 Mar 2020 10:23) (59 - 65)  BP: 95/54 (25 Mar 2020 10:23) (95/54 - 101/58)  BP(mean): --  RR: 19 (25 Mar 2020 10:23) (19 - 22)  SpO2: 97% (25 Mar 2020 10:23) (97% - 100%)    CONSTITUTIONAL: Moderate respiratory distress, speaking in short sentences  EYES: PERRLA; conjunctiva and sclera clear  RESPIRATORY: Coarse breath sounds, no wheezing   CARDIOVASCULAR: Regular rate and rhythm, normal S1 and S2, no murmur/rub/gallop; No lower extremity edema  ABDOMEN: Nontender to palpation, normoactive bowel sounds, no rebound/guarding  MUSCULOSKELETAL:  No clubbing or cyanosis of digits; no joint swelling or tenderness to palpation  PSYCH: A+O to person, place, and time; affect appropriate  NEUROLOGY: CN 2-12 are intact and symmetric; no gross sensory deficits;   SKIN: No rashes; no palpable lesions    LABS:                        11.5   7.81  )-----------( 314      ( 25 Mar 2020 06:30 )             34.3     03-25    136  |  99  |  13  ----------------------------<  108<H>  4.0   |  24  |  0.71    Ca    8.4      25 Mar 2020 06:30  Phos  4.0     03-25  Mg     2.0     03-25    TPro  6.9  /  Alb  2.7<L>  /  TBili  1.3<H>  /  DBili  x   /  AST  104<H>  /  ALT  177<H>  /  AlkPhos  95  03-25    COVID-19 PCR . (03.21.20 @ 16:40)    COVID-19 PCR: Detected: LDT - Laboratory Developed Test All “detected” results on this new test  are considered presumptively positive results, are clinically actionable,  and specimens will be forwarded to CDC for confirmation testing.  Another report (corrected report) will only be issued if discordant  results occur.  This test has been validated by [facility] to be accurate;  though it has not been FDA cleared/approved by the usual pathway.  As with all laboratory tests, results should be correlated with clinical  findings.      RADIOLOGY & ADDITIONAL TESTS:  Results Reviewed:   < from: Xray Chest 1 View AP/PA (03.21.20 @ 13:53) >  IMPRESSION:     Bilateral patchy opacities may be infectious    < end of copied text >

## 2020-03-25 NOTE — PROGRESS NOTE ADULT - PROBLEM SELECTOR PLAN 5
-- Message is from the [facility]--    Patient is requesting a medication refill - medication is on active medication list    Patient is currently OUT of the requested medication.    Was Medication Pended?  Yes.    Rx Name and Dose:  metoPROLOL succinate (TOPROL-XL) 50 MG 24 hr tablet    Duration: 90 days    Pharmacy  [facility] [place] - [address] [facility]    Patient confirmed the above pharmacy as correct?  Yes    Caller Information       Type Contact Phone    06/04/2019 10:17 AM Phone (Incoming) Jesse Johnson (Self) [phone] (H)          Alternative phone number:     Turnaround time given to caller:   \"This message will be sent to [state Provider's name]. The clinical team will fulfill your request as soon as they review your message.\"   - Discussed with  3/24, pt remains full code.  Pt is aware that his wife is currently intubated in the ICU and is critically ill.  Pt informed that daughter is doing well.   - Pt has appointed his eldest son (varun) as primary decision maker and his sister in law Demetria (numbers in chart).  - Palliative care also following closely

## 2020-03-25 NOTE — CONSULT NOTE ADULT - SUBJECTIVE AND OBJECTIVE BOX
HPI:  Patient is a 53 yo male with no past med-surg history who presents to the ED with cough and fevers. Patient states that he has had these symptoms over the past week. He has noticed myalgias and headaches that have progressively worsened over the past few days. He also has had fevers and chills while at home. Of note, patient endorses that his daughter and wife have had similar symptoms and have all come into the ED today. Patient denies any recent travel and sick contacts. He has been taking Mucinex twice at home, which has not helped much with the cough.    In the ED, his VS were BP were 92/52, , RR 20, T 99.3, RR 95% on RA. His labs were remarkable for a normal WBC count. He had a negative RVP, and a CXR that showed bialteral patchy opacities. He was given a 1 L bolus of Lactated ringers. (21 Mar 2020 20:32)    PERTINENT PM/SXH:   No pertinent past medical history    No significant past surgical history    FAMILY HISTORY:  No pertinent family history in first degree relatives    ITEMS NOT CHECKED ARE NOT PRESENT    SOCIAL HISTORY:   Significant other/partner:  [x] Carleen Izquierdo (spouse) Children:  [x] 4 children (Jaren, Briseida, Kalani and Yvette)  Yazdanism/Spirituality: Denominational   Substance hx:  [ ]   Tobacco hx:  [ ]   Alcohol hx: [ ]   Home Opioid hx:  [x] I-Stop Reference No: #: [id number]   2019/08/15  hydrocodone-acetaminophen 5-325 mg tablet # 15  Living Situation: [x]Home  [ ]Long term care  [ ]Rehab [ ]Other    ADVANCE DIRECTIVES:    DNR  MOLST  [ ]  Living Will  [ ]   DECISION MAKER(s):  [ ] Health Care Proxy(s)  [x] Surrogate(s)  [ ] Guardian           Name(s): Jaren Izquierdo (son) Phone Number(s): [phone]                Demetria Velasquez (sister in law) Phone Number(s): [phone]    BASELINE (I)ADL(s) (prior to admission):  [place]: [x]Total  [ ] Moderate [ ]Dependent    Allergies    No Known Allergies    Intolerances    MEDICATIONS  (STANDING):  hydroxychloroquine   Oral   hydroxychloroquine 200 milliGRAM(s) Oral every 12 hours  influenza   Vaccine 0.5 milliLiter(s) IntraMuscular once  midodrine. 5 milliGRAM(s) Oral three times a day    MEDICATIONS  (PRN):  acetaminophen   Tablet .. 650 milliGRAM(s) Oral every 6 hours PRN Temp greater or equal to 38.5C (101.3F), Mild Pain (1 - 3), Moderate Pain (4 - 6)  ALBUTerol    90 MICROgram(s) HFA Inhaler 1 Puff(s) Inhalation every 6 hours PRN Shortness of Breath    PRESENT SYMPTOMS: [ ]Unable to obtain due to poor mentation   Source if other than patient:  [ ]Family   [ ]Team     Pain (Impact on QOL):    Location -         Minimal acceptable level (0-10 scale):       Aggravating factors -  Quality -  Radiation -  Severity (0-10 scale) -    Timing -    PAIN AD Score:     http://[email or web address]/painad.pdf (press ctrl +  left click to view)    Dyspnea:                           [ ]Mild [ ]Moderate [x]Severe  Anxiety:                             [ ]Mild [ ]Moderate [ ]Severe  Fatigue:                             [ ]Mild [ ]Moderate [ ]Severe  Nausea:                             [ ]Mild [ ]Moderate [ ]Severe  Loss of appetite:              [ ]Mild [ ]Moderate [ ]Severe  Constipation:                    [ ]Mild [ ]Moderate [ ]Severe    Other Symptoms:  [ ]All other review of systems negative     Karnofsky Performance Score/Palliative Performance Status Version 2:         %    http://palliative.info/resource_material/PPSv2.pdf  PHYSICAL EXAM:  *** See physical exam from prior primary team, due to limited exam as pt is COVID 19 + ***    Vital Signs Last 24 Hrs  T(C): 36.9 (25 Mar 2020 10:23), Max: 37.5 (24 Mar 2020 21:00)  T(F): 98.4 (25 Mar 2020 10:23), Max: 99.5 (24 Mar 2020 21:00)  HR: 64 (25 Mar 2020 10:23) (59 - 65)  BP: 95/54 (25 Mar 2020 10:23) (95/54 - 101/58)  BP(mean): --  RR: 19 (25 Mar 2020 10:23) (19 - 22)  SpO2: 97% (25 Mar 2020 10:23) (97% - 100%) I&O's Summary    25 Mar 2020 07:01  -  25 Mar 2020 13:02  --------------------------------------------------------  IN: 200 mL / OUT: 0 mL / NET: 200 mL    GENERAL:  [ ]Alert  [ ]Oriented x   [ ]Lethargic  [ ]Cachexia  [ ]Unarousable  [ ]Verbal  [ ]Non-Verbal  Behavioral:   [ ] Anxiety  [ ] Delirium [ ] Agitation [ ] Other  HEENT:  [ ]Normal   [ ]Dry mouth   [ ]ET Tube/Trach  [ ]Oral lesions  PULMONARY:   [ ]Clear [ ]Tachypnea  [ ]Audible excessive secretions   [ ]Rhonchi        [ ]Right [ ]Left [ ]Bilateral  [ ]Crackles        [ ]Right [ ]Left [ ]Bilateral  [ ]Wheezing     [ ]Right [ ]Left [ ]Bilateral  CARDIOVASCULAR:    [ ]Regular [ ]Irregular [ ]Tachy  [ ]Sohan [ ]Murmur [ ]Other  GASTROINTESTINAL:  [ ]Soft  [ ]Distended   [ ]+BS  [ ]Non tender [ ]Tender  [ ]PEG [ ]OGT/ NGT  Last BM:   GENITOURINARY:  [ ]Normal [ ] Incontinent   [ ]Oliguria/Anuria   [ ]Faulkner  MUSCULOSKELETAL:   [ ]Normal   [ ]Weakness  [ ]Bed/Wheelchair bound [ ]Edema  NEUROLOGIC:   [ ]No focal deficits  [ ] Cognitive impairment  [ ] Dysphagia [ ]Dysarthria [ ] Paresis [ ]Other   SKIN:   [ ]Normal   [ ]Pressure ulcer(s)  [ ]Rash    CRITICAL CARE:  [ ] Shock Present  [ ]Septic [ ]Cardiogenic [ ]Neurologic [ ]Hypovolemic  [ ]  Vasopressors [ ]  Inotropes   [ ] Respiratory failure present  [ ] Acute  [ ] Chronic [ ] Hypoxic  [ ] Hypercarbic [ ] Other  [ ] Other organ failure     LABS:                        11.5   7.81  )-----------( 314      ( 25 Mar 2020 06:30 )             34.3   03-25    136  |  99  |  13  ----------------------------<  108<H>  4.0   |  24  |  0.71    Ca    8.4      25 Mar 2020 06:30  Phos  4.0     03-25  Mg     2.0     03-25    TPro  6.9  /  Alb  2.7<L>  /  TBili  1.3<H>  /  DBili  x   /  AST  104<H>  /  ALT  177<H>  /  AlkPhos  95  03-25    RADIOLOGY & ADDITIONAL STUDIES:    < from: Xray Chest 1 View AP/PA (03.21.20 @ 13:53) >  FINDINGS:    Bilateral patchy opacities. No pleural effusions or pneumothorax. Cardiomediastinal silhouette is normal. No acute osseous pathology.      IMPRESSION:     Bilateral patchy opacities may be infectious    < end of copied text >    COVID-19 PCR . (03.21.20 @ 16:40)    COVID-19 PCR: Detected: LDT - Laboratory Developed Test All “detected” results on this new test  are considered presumptively positive results, are clinically actionable,  and specimens will be forwarded to [facility] for confirmation testing.  Another report (corrected report) will only be issued if discordant  results occur.  This test has been validated by [facility] to be accurate;  though it has not been FDA cleared/approved by the usual pathway.  As with all laboratory tests, results should be correlated with clinical  findings.    PROTEIN CALORIE MALNUTRITION PRESENT: [ ] Yes [ ] No  [ ] PPSV2 < or = to 30% [ ] significant weight loss  [ ] poor nutritional intake [ ] catabolic state [ ] anasarca     Albumin, Serum: 2.7 g/dL (03-25-20 @ 06:30)  Artificial Nutrition [ ]     REFERRALS:   [ ]Chaplaincy  [ ] Hospice  [ ]Child Life  [ ]Social Work  [ ]Case management [ ]Holistic Therapy   Goals of Care Discussion Document: *** See physical exam from prior primary team, due to limited exam as pt is COVID 19 + ***    HPI:  Patient is a 55 yo male with no past med-surg history who presents to the ED with cough and fevers. Patient states that he has had these symptoms over the past week. He has noticed myalgias and headaches that have progressively worsened over the past few days. He also has had fevers and chills while at home. Of note, patient endorses that his daughter and wife have had similar symptoms and have all come into the ED today. Patient denies any recent travel and sick contacts. He has been taking Mucinex twice at home, which has not helped much with the cough.    In the ED, his VS were BP were 92/52, , RR 20, T 99.3, RR 95% on RA. His labs were remarkable for a normal WBC count. He had a negative RVP, and a CXR that showed bialteral patchy opacities. He was given a 1 L bolus of Lactated ringers. (21 Mar 2020 20:32)    PERTINENT PM/SXH:   No pertinent past medical history    No significant past surgical history    FAMILY HISTORY:  No pertinent family history in first degree relatives    ITEMS NOT CHECKED ARE NOT PRESENT    SOCIAL HISTORY:   Significant other/partner:  [x] Carleen Izquierdo (spouse) Children:  [x] 4 children (Jaren, Briseida, Kalani and Yvette)  Judaism/Spirituality: Buddhist   Substance hx:  [ ]   Tobacco hx:  [ ]   Alcohol hx: [ ]   Home Opioid hx:  [x] I-Stop Reference No: #: [id number]   2019/08/15  hydrocodone-acetaminophen 5-325 mg tablet # 15  Living Situation: [x]Home  [ ]Long term care  [ ]Rehab [ ]Other    ADVANCE DIRECTIVES:    DNR  MOLST  [ ]  Living Will  [ ]   DECISION MAKER(s):  [ ] Health Care Proxy(s)  [x] Surrogate(s)  [ ] Guardian           Name(s): Jaren Izquierdo (son) Phone Number(s): [phone]                Demetria Velasquez (sister in law) Phone Number(s): [phone]    BASELINE (I)ADL(s) (prior to admission):  [place]: [x]Total  [ ] Moderate [ ]Dependent    Allergies    No Known Allergies    Intolerances    MEDICATIONS  (STANDING):  hydroxychloroquine   Oral   hydroxychloroquine 200 milliGRAM(s) Oral every 12 hours  influenza   Vaccine 0.5 milliLiter(s) IntraMuscular once  midodrine. 5 milliGRAM(s) Oral three times a day    MEDICATIONS  (PRN):  acetaminophen   Tablet .. 650 milliGRAM(s) Oral every 6 hours PRN Temp greater or equal to 38.5C (101.3F), Mild Pain (1 - 3), Moderate Pain (4 - 6)  ALBUTerol    90 MICROgram(s) HFA Inhaler 1 Puff(s) Inhalation every 6 hours PRN Shortness of Breath    PRESENT SYMPTOMS: [ ]Unable to obtain due to poor mentation   Source if other than patient:  [ ]Family   [ ]Team     Pain (Impact on QOL):    Location -         Minimal acceptable level (0-10 scale):       Aggravating factors -  Quality -  Radiation -  Severity (0-10 scale) -    Timing -    PAIN AD Score:     http://geriatrictoolkit.[facility]/cog/painad.pdf (press ctrl +  left click to view)    Dyspnea:                           [ ]Mild [ ]Moderate [x]Severe  Anxiety:                             [ ]Mild [ ]Moderate [ ]Severe  Fatigue:                             [ ]Mild [ ]Moderate [ ]Severe  Nausea:                             [ ]Mild [ ]Moderate [ ]Severe  Loss of appetite:              [ ]Mild [ ]Moderate [ ]Severe  Constipation:                    [ ]Mild [ ]Moderate [ ]Severe    Other Symptoms:  [ ]All other review of systems negative     Karnofsky Performance Score/Palliative Performance Status Version 2:         %    http://palliative.info/resource_material/PPSv2.pdf  PHYSICAL EXAM:  *** See physical exam from prior primary team, due to limited exam as pt is COVID 19 + ***    Vital Signs Last 24 Hrs  T(C): 36.9 (25 Mar 2020 10:23), Max: 37.5 (24 Mar 2020 21:00)  T(F): 98.4 (25 Mar 2020 10:23), Max: 99.5 (24 Mar 2020 21:00)  HR: 64 (25 Mar 2020 10:23) (59 - 65)  BP: 95/54 (25 Mar 2020 10:23) (95/54 - 101/58)  BP(mean): --  RR: 19 (25 Mar 2020 10:23) (19 - 22)  SpO2: 97% (25 Mar 2020 10:23) (97% - 100%) I&O's Summary    25 Mar 2020 07:01  -  25 Mar 2020 13:02  --------------------------------------------------------  IN: 200 mL / OUT: 0 mL / NET: 200 mL    GENERAL:  [ ]Alert  [ ]Oriented x   [ ]Lethargic  [ ]Cachexia  [ ]Unarousable  [ ]Verbal  [ ]Non-Verbal  Behavioral:   [ ] Anxiety  [ ] Delirium [ ] Agitation [ ] Other  HEENT:  [ ]Normal   [ ]Dry mouth   [ ]ET Tube/Trach  [ ]Oral lesions  PULMONARY:   [ ]Clear [ ]Tachypnea  [ ]Audible excessive secretions   [ ]Rhonchi        [ ]Right [ ]Left [ ]Bilateral  [ ]Crackles        [ ]Right [ ]Left [ ]Bilateral  [ ]Wheezing     [ ]Right [ ]Left [ ]Bilateral  CARDIOVASCULAR:    [ ]Regular [ ]Irregular [ ]Tachy  [ ]Sohan [ ]Murmur [ ]Other  GASTROINTESTINAL:  [ ]Soft  [ ]Distended   [ ]+BS  [ ]Non tender [ ]Tender  [ ]PEG [ ]OGT/ NGT  Last BM:   GENITOURINARY:  [ ]Normal [ ] Incontinent   [ ]Oliguria/Anuria   [ ][place]ner  MUSCULOSKELETAL:   [ ]Normal   [ ]Weakness  [ ]Bed/Wheelchair bound [ ]Edema  NEUROLOGIC:   [ ]No focal deficits  [ ] Cognitive impairment  [ ] Dysphagia [ ]Dysarthria [ ] Paresis [ ]Other   SKIN:   [ ]Normal   [ ]Pressure ulcer(s)  [ ]Rash    CRITICAL CARE:  [ ] Shock Present  [ ]Septic [ ]Cardiogenic [ ]Neurologic [ ]Hypovolemic  [ ]  Vasopressors [ ]  Inotropes   [ ] Respiratory failure present  [ ] Acute  [ ] Chronic [ ] Hypoxic  [ ] Hypercarbic [ ] Other  [ ] Other organ failure     LABS:                        11.5   7.81  )-----------( 314      ( 25 Mar 2020 06:30 )             34.3   03-25    136  |  99  |  13  ----------------------------<  108<H>  4.0   |  24  |  0.71    Ca    8.4      25 Mar 2020 06:30  Phos  4.0     03-25  Mg     2.0     03-25    TPro  6.9  /  Alb  2.7<L>  /  TBili  1.3<H>  /  DBili  x   /  AST  104<H>  /  ALT  177<H>  /  AlkPhos  95  03-25    RADIOLOGY & ADDITIONAL STUDIES:    < from: Xray Chest 1 View AP/PA (03.21.20 @ 13:53) >  FINDINGS:    Bilateral patchy opacities. No pleural effusions or pneumothorax. Cardiomediastinal silhouette is normal. No acute osseous pathology.      IMPRESSION:     Bilateral patchy opacities may be infectious    < end of copied text >    COVID-19 PCR . (03.21.20 @ 16:40)    COVID-19 PCR: Detected: LDT - Laboratory Developed Test All “detected” results on this new test  are considered presumptively positive results, are clinically actionable,  and specimens will be forwarded to CDC for confirmation testing.  Another report (corrected report) will only be issued if discordant  results occur.  This test has been validated by [facility] to be accurate;  though it has not been FDA cleared/approved by the usual pathway.  As with all laboratory tests, results should be correlated with clinical  findings.    PROTEIN CALORIE MALNUTRITION PRESENT: [ ] Yes [ ] No  [ ] PPSV2 < or = to 30% [ ] significant weight loss  [ ] poor nutritional intake [ ] catabolic state [ ] anasarca     Albumin, Serum: 2.7 g/dL (03-25-20 @ 06:30)  Artificial Nutrition [ ]     REFERRALS:   [ ]Chaplaincy  [ ] Hospice  [ ]Child Life  [ ]Social Work  [ ]Case management [ ]Holistic Therapy   Goals of Care Discussion Document:

## 2020-03-25 NOTE — PROGRESS NOTE ADULT - PROBLEM SELECTOR PLAN 2
- COVID19 positive   - Albuterol inhaler prn for shortness of breath  - Avoid nebulizing treatments as this can aerosolize infection  - RVP negative  - Currently saturating well on 6L NC   - Will begin to trend procalcitonin, CRP, ESR and Ferritin q72 hours for prognostication  - Will initiate PRN IV morphine for respiratory distress and reassess symptoms

## 2020-03-25 NOTE — CONSULT NOTE ADULT - ATTENDING COMMENTS
Discussed above with Dr. Gunter whom also speaks [place]ian and aided in communication. Agree with the assessment and plan.

## 2020-03-25 NOTE — CONSULT NOTE ADULT - PROBLEM SELECTOR RECOMMENDATION 2
- Would recommend adding Morphine 1mg IV q/ 8 hrs as needed for severe labored breathing or RR > 30 - Would recommend adding Morphine 1mg IV q/ 6 hrs as needed for severe labored breathing or RR > 30  - Would recommend giving a dose now x 1 - Would recommend adding Morphine 1mg IV q/ 4 hrs as needed for severe labored breathing or RR > 30  - Would recommend giving a dose now x 1

## 2020-03-26 LAB
ALBUMIN SERPL ELPH-MCNC: 3.2 G/DL — LOW (ref 3.3–5)
ALP SERPL-CCNC: 134 U/L — HIGH (ref 40–120)
ALT FLD-CCNC: 184 U/L — HIGH (ref 4–41)
ANION GAP SERPL CALC-SCNC: 12 MMO/L — SIGNIFICANT CHANGE UP (ref 7–14)
AST SERPL-CCNC: 87 U/L — HIGH (ref 4–40)
BASOPHILS # BLD AUTO: 0.01 K/UL — SIGNIFICANT CHANGE UP (ref 0–0.2)
BASOPHILS NFR BLD AUTO: 0.1 % — SIGNIFICANT CHANGE UP (ref 0–2)
BILIRUB SERPL-MCNC: 1.3 MG/DL — HIGH (ref 0.2–1.2)
BUN SERPL-MCNC: 14 MG/DL — SIGNIFICANT CHANGE UP (ref 7–23)
CALCIUM SERPL-MCNC: 9.3 MG/DL — SIGNIFICANT CHANGE UP (ref 8.4–10.5)
CHLORIDE SERPL-SCNC: 99 MMOL/L — SIGNIFICANT CHANGE UP (ref 98–107)
CO2 SERPL-SCNC: 27 MMOL/L — SIGNIFICANT CHANGE UP (ref 22–31)
CREAT SERPL-MCNC: 0.71 MG/DL — SIGNIFICANT CHANGE UP (ref 0.5–1.3)
CRP SERPL-MCNC: 266.8 MG/L — HIGH
CULTURE RESULTS: SIGNIFICANT CHANGE UP
CULTURE RESULTS: SIGNIFICANT CHANGE UP
EOSINOPHIL # BLD AUTO: 0.07 K/UL — SIGNIFICANT CHANGE UP (ref 0–0.5)
EOSINOPHIL NFR BLD AUTO: 1 % — SIGNIFICANT CHANGE UP (ref 0–6)
ERYTHROCYTE [SEDIMENTATION RATE] IN BLOOD: 102 MM/HR — HIGH (ref 1–15)
FERRITIN SERPL-MCNC: 1943 NG/ML — HIGH (ref 30–400)
GLUCOSE SERPL-MCNC: 103 MG/DL — HIGH (ref 70–99)
HCT VFR BLD CALC: 35.8 % — LOW (ref 39–50)
HGB BLD-MCNC: 11.9 G/DL — LOW (ref 13–17)
IMM GRANULOCYTES NFR BLD AUTO: 0.7 % — SIGNIFICANT CHANGE UP (ref 0–1.5)
LYMPHOCYTES # BLD AUTO: 1.14 K/UL — SIGNIFICANT CHANGE UP (ref 1–3.3)
LYMPHOCYTES # BLD AUTO: 15.9 % — SIGNIFICANT CHANGE UP (ref 13–44)
MAGNESIUM SERPL-MCNC: 2.3 MG/DL — SIGNIFICANT CHANGE UP (ref 1.6–2.6)
MCHC RBC-ENTMCNC: 31.2 PG — SIGNIFICANT CHANGE UP (ref 27–34)
MCHC RBC-ENTMCNC: 33.2 % — SIGNIFICANT CHANGE UP (ref 32–36)
MCV RBC AUTO: 93.7 FL — SIGNIFICANT CHANGE UP (ref 80–100)
MONOCYTES # BLD AUTO: 0.51 K/UL — SIGNIFICANT CHANGE UP (ref 0–0.9)
MONOCYTES NFR BLD AUTO: 7.1 % — SIGNIFICANT CHANGE UP (ref 2–14)
NEUTROPHILS # BLD AUTO: 5.4 K/UL — SIGNIFICANT CHANGE UP (ref 1.8–7.4)
NEUTROPHILS NFR BLD AUTO: 75.2 % — SIGNIFICANT CHANGE UP (ref 43–77)
NRBC # FLD: 0 K/UL — SIGNIFICANT CHANGE UP (ref 0–0)
PHOSPHATE SERPL-MCNC: 3.7 MG/DL — SIGNIFICANT CHANGE UP (ref 2.5–4.5)
PLATELET # BLD AUTO: 370 K/UL — SIGNIFICANT CHANGE UP (ref 150–400)
PMV BLD: 9.9 FL — SIGNIFICANT CHANGE UP (ref 7–13)
POTASSIUM SERPL-MCNC: 4.2 MMOL/L — SIGNIFICANT CHANGE UP (ref 3.5–5.3)
POTASSIUM SERPL-SCNC: 4.2 MMOL/L — SIGNIFICANT CHANGE UP (ref 3.5–5.3)
PROCALCITONIN SERPL-MCNC: 0.15 NG/ML — HIGH (ref 0.02–0.1)
PROT SERPL-MCNC: 7.6 G/DL — SIGNIFICANT CHANGE UP (ref 6–8.3)
RBC # BLD: 3.82 M/UL — LOW (ref 4.2–5.8)
RBC # FLD: 11.9 % — SIGNIFICANT CHANGE UP (ref 10.3–14.5)
SODIUM SERPL-SCNC: 138 MMOL/L — SIGNIFICANT CHANGE UP (ref 135–145)
SPECIMEN SOURCE: SIGNIFICANT CHANGE UP
SPECIMEN SOURCE: SIGNIFICANT CHANGE UP
WBC # BLD: 7.18 K/UL — SIGNIFICANT CHANGE UP (ref 3.8–10.5)
WBC # FLD AUTO: 7.18 K/UL — SIGNIFICANT CHANGE UP (ref 3.8–10.5)

## 2020-03-26 PROCEDURE — 99233 SBSQ HOSP IP/OBS HIGH 50: CPT | Mod: GC

## 2020-03-26 PROCEDURE — 93010 ELECTROCARDIOGRAM REPORT: CPT

## 2020-03-26 PROCEDURE — 99233 SBSQ HOSP IP/OBS HIGH 50: CPT

## 2020-03-26 RX ORDER — MIDODRINE HYDROCHLORIDE 2.5 MG/1
10 TABLET ORAL THREE TIMES A DAY
Refills: 0 | Status: DISCONTINUED | OUTPATIENT
Start: 2020-03-26 | End: 2020-03-30

## 2020-03-26 RX ORDER — SODIUM CHLORIDE 9 MG/ML
500 INJECTION INTRAMUSCULAR; INTRAVENOUS; SUBCUTANEOUS ONCE
Refills: 0 | Status: COMPLETED | OUTPATIENT
Start: 2020-03-26 | End: 2020-03-26

## 2020-03-26 RX ADMIN — Medication 200 MILLIGRAM(S): at 12:46

## 2020-03-26 RX ADMIN — MIDODRINE HYDROCHLORIDE 5 MILLIGRAM(S): 2.5 TABLET ORAL at 06:46

## 2020-03-26 RX ADMIN — MIDODRINE HYDROCHLORIDE 10 MILLIGRAM(S): 2.5 TABLET ORAL at 17:18

## 2020-03-26 RX ADMIN — MIDODRINE HYDROCHLORIDE 10 MILLIGRAM(S): 2.5 TABLET ORAL at 12:11

## 2020-03-26 RX ADMIN — Medication 650 MILLIGRAM(S): at 21:20

## 2020-03-26 RX ADMIN — SODIUM CHLORIDE 1000 MILLILITER(S): 9 INJECTION INTRAMUSCULAR; INTRAVENOUS; SUBCUTANEOUS at 11:30

## 2020-03-26 RX ADMIN — Medication 650 MILLIGRAM(S): at 22:15

## 2020-03-26 RX ADMIN — Medication 650 MILLIGRAM(S): at 00:00

## 2020-03-26 RX ADMIN — Medication 200 MILLIGRAM(S): at 03:11

## 2020-03-26 NOTE — PROGRESS NOTE ADULT - PROBLEM SELECTOR PLAN 2
- COVID19 positive   - Albuterol inhaler prn for shortness of breath  - Avoid nebulizing treatments as this can aerosolize infection  - RVP negative  - Currently saturating well on 6L NC   - Continue trending procalcitonin, CRP, ESR and Ferritin q48-72 hours for prognostication (next due 3/28)   - Continue PRN IV morphine for respiratory distress, pt finds much benefit with this medication

## 2020-03-26 NOTE — PROGRESS NOTE ADULT - SUBJECTIVE AND OBJECTIVE BOX
[facility] Division of Hospital Medicine  Ralph Jesus DO  Pager (RYAN, 8A-5P): [phone]    Patient is a 54y old  Male who presents with a chief complaint of Fevers and Cough (26 Mar 2020 12:19)    SUBJECTIVE / OVERNIGHT EVENTS: Pt feels much better today, felt that the IV morphine made his breathing much more comfortable.  Still with dry cough, able to lie prone while he's resting.      MEDICATIONS  (STANDING):  hydroxychloroquine   Oral   hydroxychloroquine 200 milliGRAM(s) Oral every 12 hours  influenza   Vaccine 0.5 milliLiter(s) IntraMuscular once  midodrine. 10 milliGRAM(s) Oral three times a day    MEDICATIONS  (PRN):  acetaminophen   Tablet .. 650 milliGRAM(s) Oral every 6 hours PRN Temp greater or equal to 38.5C (101.3F), Mild Pain (1 - 3), Moderate Pain (4 - 6)  ALBUTerol    90 MICROgram(s) HFA Inhaler 1 Puff(s) Inhalation every 6 hours PRN Shortness of Breath  glycopyrrolate Injectable 0.2 milliGRAM(s) IV Push every 8 hours PRN secretions  morphine  - Injectable 1 milliGRAM(s) IV Push every 4 hours PRN dyspnea      CAPILLARY BLOOD GLUCOSE  I&O's Summary    25 Mar 2020 07:01  -  26 Mar 2020 07:00  --------------------------------------------------------  IN: 300 mL / OUT: 500 mL / NET: -200 mL    PHYSICAL EXAM:  Vital Signs Last 24 Hrs  T(C): 36.8 (26 Mar 2020 12:08), Max: 37.3 (25 Mar 2020 21:45)  T(F): 98.3 (26 Mar 2020 12:08), Max: 99.2 (25 Mar 2020 21:45)  HR: 60 (26 Mar 2020 12:08) (59 - 61)  BP: 94/36 (26 Mar 2020 12:08) (88/52 - 102/56)  BP(mean): --  RR: 18 (26 Mar 2020 12:08) (18 - 18)  SpO2: 98% (26 Mar 2020 12:08) (95% - 100%)    CONSTITUTIONAL: Mild distress 2/2 SOB, visibly more comfortable than yesterday   EYES: PERRLA; conjunctiva and sclera clear  RESPIRATORY: Tachypnea improved; lungs are clear to auscultation bilaterally  CARDIOVASCULAR: Regular rate and rhythm, normal S1 and S2, no murmur/rub/gallop; No lower extremity edema  ABDOMEN: Nontender to palpation, normoactive bowel sounds, no rebound/guarding  MUSCULOSKELETAL: No joint swelling or tenderness to palpation  PSYCH: A+O to person, place, and time; affect appropriate  NEUROLOGY: CN 2-12 are intact and symmetric; no gross sensory deficits;     LABS:                        11.9   7.18  )-----------( 370      ( 26 Mar 2020 05:10 )             35.8     03-26    138  |  99  |  14  ----------------------------<  103<H>  4.2   |  27  |  0.71    Ca    9.3      26 Mar 2020 05:10  Phos  3.7     03-26  Mg     2.3     03-26    TPro  7.6  /  Alb  3.2<L>  /  TBili  1.3<H>  /  DBili  x   /  AST  87<H>  /  ALT  184<H>  /  AlkPhos  134<H>  03-26    COVID-19 PCR . (03.21.20 @ 16:40)    COVID-19 PCR: Detected: LDT - Laboratory Developed Test All “detected” results on this new test  are considered presumptively positive results, are clinically actionable,  and specimens will be forwarded to [facility] for confirmation testing.  Another report (corrected report) will only be issued if discordant  results occur.  This test has been validated by "[employer] to be accurate;  though it has not been FDA cleared/approved by the usual pathway.  As with all laboratory tests, results should be correlated with clinical  findings.    Sedimentation Rate, Erythrocyte (03.26.20 @ 05:10)    Sedimentation Rate, Erythrocyte: 102 mm/hr    C-Reactive Protein, Serum (03.26.20 @ 05:10)    C-Reactive Protein, Serum: 266.8 mg/L    Ferritin, Serum in AM (03.26.20 @ 05:10)    Ferritin, Serum: 1943 ng/mL    Procalcitonin, Serum (03.26.20 @ 05:10)    Procalcitonin, Serum: 0.15: Procalcitonin (PCT) Interpretation (ng/mL) - Diagnosis of  systemic bacterial infection/sepsis:  PCT < 0.5: Systemic infection (sepsis) is not likely and  risk for progression to severe systemic infection is low.  Local bacterial infection is possible. If early sepsis is  suspected clinically, PCT should be re-assessed in 6-24  hours.  PCT >/= 0.5 but < 2.0: Systemic infection (sepsis) is  possible, but other conditions are known to elevate PCT as  well. Moderate risk for progression to severe systemic  infection. The patient should be closely monitored both  clinically and by re-assessing PCT within 6-24 hours.  PCT >/= 2.0 but < 10.0: Systemic infection (sepsis) is  likely, unless other causes are known. High risk of  progression to severe systemic infection (severe  sepsis/septic shock).  PCT >/= 10.0: Important systemic inflammatory response,  almost exclusively due to severe bacterial sepsis or septic  shock. High likelihood of severe sepsis or septic shock. ng/mL      RADIOLOGY & ADDITIONAL TESTS:  Results Reviewed:   < from: Xray Chest 1 View AP/PA (03.21.20 @ 13:53) >  IMPRESSION:     Bilateral patchy opacities may be infectious    < end of copied text >    COORDINATION OF CARE:  Care Discussed with Consultants/Other Providers [Y/N]:  Yes Palliative Care Dr. Gunter

## 2020-03-26 NOTE — PROGRESS NOTE ADULT - PROBLEM SELECTOR PLAN 5
- Pt is full code.  Pt is aware that his wife is currently intubated in the ICU and is critically ill.  Pt informed that daughter is doing well.   - Pt has appointed his eldest son (varun) as primary decision maker and his sister in law Demetria (numbers in chart).  - Palliative care also following closely

## 2020-03-26 NOTE — PROGRESS NOTE ADULT - SUBJECTIVE AND OBJECTIVE BOX
*** See physical exam from prior primary team, due to limited exam as pt is COVID 19 + ***    SUBJECTIVE AND OBJECTIVE:  Pt was reported doing better, no on 6L nasal cannula.     INTERVAL HPI/OVERNIGHT EVENTS:  Pt received BTD x 1 of morphine in past 24 hrs. Midodrine dose increased to 10mg TID.    DNR on chart:   Allergies    No Known Allergies    Intolerances    MEDICATIONS  (STANDING):  hydroxychloroquine   Oral   hydroxychloroquine 200 milliGRAM(s) Oral every 12 hours  influenza   Vaccine 0.5 milliLiter(s) IntraMuscular once  midodrine. 10 milliGRAM(s) Oral three times a day    MEDICATIONS  (PRN):  acetaminophen   Tablet .. 650 milliGRAM(s) Oral every 6 hours PRN Temp greater or equal to 38.5C (101.3F), Mild Pain (1 - 3), Moderate Pain (4 - 6)  ALBUTerol    90 MICROgram(s) HFA Inhaler 1 Puff(s) Inhalation every 6 hours PRN Shortness of Breath  glycopyrrolate Injectable 0.2 milliGRAM(s) IV Push every 8 hours PRN secretions  morphine  - Injectable 1 milliGRAM(s) IV Push every 4 hours PRN dyspnea    ITEMS UNCHECKED ARE NOT PRESENT    PRESENT SYMPTOMS: [ ]Unable to obtain due to poor mentation   Source if other than patient:  [ ]Family   [ ]Team     Pain (Impact on QOL):    Location:  Minimal acceptable level (0-10 scale):            Aggravating factors:  Quality:  Radiation:  Severity (0-10 scale):    Timing:    Dyspnea:                           [ ]Mild [ ]Moderate [ ]Severe  Anxiety:                             [ ]Mild [ ]Moderate [ ]Severe  Fatigue:                             [ ]Mild [ ]Moderate [ ]Severe  Nausea:                             [ ]Mild [ ]Moderate [ ]Severe  Loss of appetite:              [ ]Mild [ ]Moderate [ ]Severe  Constipation:                    [ ]Mild [ ]Moderate [ ]Severe    PAIN AD Score:	  http://geriatrictoolkit.[employer].edu/cog/painad.pdf (Ctrl + left click to view)    Other Symptoms:  [ ]All other review of systems negative     Karnofsky Performance Score/Palliative Performance Status Version 2:         %    http://palliative.info/resource_material/PPSv2.pdf    PHYSICAL EXAM:  *** See physical exam from prior primary team, due to limited exam as pt is COVID 19 + ***    Vital Signs Last 24 Hrs  T(C): 36.8 (26 Mar 2020 12:08), Max: 37.3 (25 Mar 2020 21:45)  T(F): 98.3 (26 Mar 2020 12:08), Max: 99.2 (25 Mar 2020 21:45)  HR: 60 (26 Mar 2020 12:08) (59 - 61)  BP: 94/36 (26 Mar 2020 12:08) (88/52 - 102/56)  BP(mean): --  RR: 18 (26 Mar 2020 12:08) (18 - 18)  SpO2: 98% (26 Mar 2020 12:08) (95% - 100%) I&O's Summary    25 Mar 2020 07:01  -  26 Mar 2020 07:00  --------------------------------------------------------  IN: 300 mL / OUT: 500 mL / NET: -200 mL     GENERAL:  [ ]Alert  [ ]Oriented x   [ ]Lethargic  [ ]Cachexia  [ ]Unarousable  [ ]Verbal  [ ]Non-Verbal    Behavioral:   [ ] Anxiety  [ ] Delirium [ ] Agitation [ ] Other    HEENT:  [ ]Normal   [ ]Dry mouth   [ ]ET Tube/Trach  [ ]Oral lesions    PULMONARY:   [ ]Clear [ ]Tachypnea  [ ]Audible excessive secretions   [ ]Rhonchi        [ ]Right [ ]Left [ ]Bilateral  [ ]Crackles        [ ]Right [ ]Left [ ]Bilateral  [ ]Wheezing     [ ]Right [ ]Left [ ]Bilateral    CARDIOVASCULAR:    [ ]Regular [ ]Irregular [ ]Tachy  [ ]Sohan [ ]Murmur [ ]Other    GASTROINTESTINAL:  [ ]Soft  [ ]Distended   [ ]+BS  [ ]Non tender [ ]Tender  [ ]PEG [ ]OGT/ NGT   Last BM:    GENITOURINARY:  [ ]Normal [ ] Incontinent   [ ]Oliguria/Anuria   [ ]Faulkner    MUSCULOSKELETAL:   [ ]Normal   [ ]Weakness  [ ]Bed/Wheelchair bound [ ]Edema    NEUROLOGIC:   [ ]No focal deficits  [ ] Cognitive impairment  [ ] Dysphagia [ ]Dysarthria [ ] Paresis [ ]Other     SKIN:   [ ]Normal   [ ]Pressure ulcer(s)  [ ]Rash    CRITICAL CARE:  [ ] Shock Present  [ ]Septic [ ]Cardiogenic [ ]Neurologic [ ]Hypovolemic  [ ]  Vasopressors [ ]  Inotropes   [ ] Respiratory failure present  [ ] Acute  [ ] Chronic [ ] Hypoxic  [ ] Hypercarbic [ ] Other  [ ] Other organ failure     LABS:                        11.9   7.18  )-----------( 370      ( 26 Mar 2020 05:10 )             35.8   03-26    138  |  99  |  14  ----------------------------<  103<H>  4.2   |  27  |  0.71    Ca    9.3      26 Mar 2020 05:10  Phos  3.7     03-26  Mg     2.3     03-26    TPro  7.6  /  Alb  3.2<L>  /  TBili  1.3<H>  /  DBili  x   /  AST  87<H>  /  ALT  184<H>  /  AlkPhos  134<H>  03-26    RADIOLOGY & ADDITIONAL STUDIES:    no recent imaging done    Protein Calorie Malnutrition Present: [ ] yes [ ] no  [ ] PPSV2 < or = 30%  [ ] significant weight loss [ ] poor nutritional intake [ ] anasarca [ ] catabolic state Albumin, Serum: 3.2 g/dL (03-26-20 @ 05:10)  Artificial Nutrition [ ]     REFERRALS:   [ ]Chaplaincy  [ ] Hospice  [ ]Child Life  [ ]Social Work  [ ]Case management [ ]Holistic Therapy   Goals of Care Document: KASSI Moran (03-25-20 @ 15:00)  Goals of Care Conversation:  please refer to [facility] note *** See physical exam from prior primary team, due to limited exam as pt is COVID 19 + ***    SUBJECTIVE AND OBJECTIVE:  Pt was reported doing better, no on 6L nasal cannula.     INTERVAL HPI/OVERNIGHT EVENTS:  Pt received BTD x 1 of morphine in past 24 hrs. Midodrine dose increased to 10mg TID.    DNR on chart:   Allergies    No Known Allergies    Intolerances    MEDICATIONS  (STANDING):  hydroxychloroquine   Oral   hydroxychloroquine 200 milliGRAM(s) Oral every 12 hours  influenza   Vaccine 0.5 milliLiter(s) IntraMuscular once  midodrine. 10 milliGRAM(s) Oral three times a day    MEDICATIONS  (PRN):  acetaminophen   Tablet .. 650 milliGRAM(s) Oral every 6 hours PRN Temp greater or equal to 38.5C (101.3F), Mild Pain (1 - 3), Moderate Pain (4 - 6)  ALBUTerol    90 MICROgram(s) HFA Inhaler 1 Puff(s) Inhalation every 6 hours PRN Shortness of Breath  glycopyrrolate Injectable 0.2 milliGRAM(s) IV Push every 8 hours PRN secretions  morphine  - Injectable 1 milliGRAM(s) IV Push every 4 hours PRN dyspnea    ITEMS UNCHECKED ARE NOT PRESENT    PRESENT SYMPTOMS: [ ]Unable to obtain due to poor mentation   Source if other than patient:  [ ]Family   [ ]Team     Pain (Impact on QOL):    Location:  Minimal acceptable level (0-10 scale):            Aggravating factors:  Quality:  Radiation:  Severity (0-10 scale):    Timing:    Dyspnea:                           [ ]Mild [ ]Moderate [ ]Severe  Anxiety:                             [ ]Mild [ ]Moderate [ ]Severe  Fatigue:                             [ ]Mild [ ]Moderate [ ]Severe  Nausea:                             [ ]Mild [ ]Moderate [ ]Severe  Loss of appetite:              [ ]Mild [ ]Moderate [ ]Severe  Constipation:                    [ ]Mild [ ]Moderate [ ]Severe    PAIN AD Score:	  http://geriatrictoolkit.[employer].edu/cog/painad.pdf (Ctrl + left click to view)    Other Symptoms:  [ ]All other review of systems negative     Karnofsky Performance Score/Palliative Performance Status Version 2:         %    http://palliative.info/resource_material/PPSv2.pdf    PHYSICAL EXAM:  *** See physical exam from prior primary team, due to limited exam as pt is COVID 19 + ***    Vital Signs Last 24 Hrs  T(C): 36.8 (26 Mar 2020 12:08), Max: 37.3 (25 Mar 2020 21:45)  T(F): 98.3 (26 Mar 2020 12:08), Max: 99.2 (25 Mar 2020 21:45)  HR: 60 (26 Mar 2020 12:08) (59 - 61)  BP: 94/36 (26 Mar 2020 12:08) (88/52 - 102/56)  BP(mean): --  RR: 18 (26 Mar 2020 12:08) (18 - 18)  SpO2: 98% (26 Mar 2020 12:08) (95% - 100%) I&O's Summary    25 Mar 2020 07:01  -  26 Mar 2020 07:00  --------------------------------------------------------  IN: 300 mL / OUT: 500 mL / NET: -200 mL     GENERAL:  [ ]Alert  [ ]Oriented x   [ ]Lethargic  [ ]Cachexia  [ ]Unarousable  [ ]Verbal  [ ]Non-Verbal    Behavioral:   [ ] Anxiety  [ ] Delirium [ ] Agitation [ ] Other    HEENT:  [ ]Normal   [ ]Dry mouth   [ ]ET Tube/Trach  [ ]Oral lesions    PULMONARY:   [ ]Clear [ ]Tachypnea  [ ]Audible excessive secretions   [ ]Rhonchi        [ ]Right [ ]Left [ ]Bilateral  [ ]Crackles        [ ]Right [ ]Left [ ]Bilateral  [ ]Wheezing     [ ]Right [ ]Left [ ]Bilateral    CARDIOVASCULAR:    [ ]Regular [ ]Irregular [ ]Tachy  [ ]Sohan [ ]Murmur [ ]Other    GASTROINTESTINAL:  [ ]Soft  [ ]Distended   [ ]+BS  [ ]Non tender [ ]Tender  [ ]PEG [ ]OGT/ NGT   Last BM:    GENITOURINARY:  [ ]Normal [ ] Incontinent   [ ]Oliguria/Anuria   [ ]Faulkner    MUSCULOSKELETAL:   [ ]Normal   [ ]Weakness  [ ]Bed/Wheelchair bound [ ]Edema    NEUROLOGIC:   [ ]No focal deficits  [ ] Cognitive impairment  [ ] Dysphagia [ ]Dysarthria [ ] Paresis [ ]Other     SKIN:   [ ]Normal   [ ]Pressure ulcer(s)  [ ]Rash    CRITICAL CARE:  [ ] Shock Present  [ ]Septic [ ]Cardiogenic [ ]Neurologic [ ]Hypovolemic  [ ]  Vasopressors [ ]  Inotropes   [ ] Respiratory failure present  [ ] Acute  [ ] Chronic [ ] Hypoxic  [ ] Hypercarbic [ ] Other  [ ] Other organ failure     LABS:                        11.9   7.18  )-----------( 370      ( 26 Mar 2020 05:10 )             35.8   03-26    138  |  99  |  14  ----------------------------<  103<H>  4.2   |  27  |  0.71    Ca    9.3      26 Mar 2020 05:10  Phos  3.7     03-26  Mg     2.3     03-26    TPro  7.6  /  Alb  3.2<L>  /  TBili  1.3<H>  /  DBili  x   /  AST  87<H>  /  ALT  184<H>  /  AlkPhos  134<H>  03-26    RADIOLOGY & ADDITIONAL STUDIES: reviewed    no recent imaging done    Protein Calorie Malnutrition Present: [ ] yes [ ] no  [ ] PPSV2 < or = 30%  [ ] significant weight loss [ ] poor nutritional intake [ ] anasarca [ ] catabolic state Albumin, Serum: 3.2 g/dL (03-26-20 @ 05:10)  Artificial Nutrition [ ]     REFERRALS:   [ ]Chaplaincy  [ ] Hospice  [ ]Child Life  [ ]Social Work  [ ]Case management [ ]Holistic Therapy   Goals of Care Document: KASSI Moran (03-25-20 @ 15:00)  Goals of Care Conversation:  please refer to [facility] note

## 2020-03-26 NOTE — PROGRESS NOTE ADULT - PROBLEM SELECTOR PLAN 2
- Pt received BTD x 1 in the past 24 hrs   - Continue with Morphine 1mg IV q/ 4 hrs as needed for severe labored breathing or RR > 30

## 2020-03-26 NOTE — PROGRESS NOTE ADULT - PROBLEM SELECTOR PLAN 3
- Refer to goals of care note  - Update given to son Jaren and sister in law Demetria about families medical status. They continue to have communication with pt via text messages.

## 2020-03-26 NOTE — PROGRESS NOTE ADULT - PROBLEM SELECTOR PLAN 1
- Patient presented with tachypnea and fevers 2/2 COVID19. Pt now afebrile for past 48 hours  - Started hydroxychloroquine 3/23 given escalating O2 requirements, repeat EKG with QTc 420 (3/25)   - Blood cultures x2 negative  - UA/RVP negative, CXR with bilateral patchy opacities consistent with COVID infection

## 2020-03-26 NOTE — PROGRESS NOTE ADULT - PROBLEM SELECTOR PLAN 1
- COVID19 positive   - Albuterol inhaler prn for shortness of breath  - Pt now tolerating 6L nasal cannula

## 2020-03-27 PROCEDURE — 99233 SBSQ HOSP IP/OBS HIGH 50: CPT

## 2020-03-27 PROCEDURE — 93010 ELECTROCARDIOGRAM REPORT: CPT

## 2020-03-27 RX ORDER — ENOXAPARIN SODIUM 100 MG/ML
40 INJECTION SUBCUTANEOUS DAILY
Refills: 0 | Status: DISCONTINUED | OUTPATIENT
Start: 2020-03-27 | End: 2020-04-01

## 2020-03-27 RX ADMIN — Medication 200 MILLIGRAM(S): at 15:14

## 2020-03-27 RX ADMIN — MIDODRINE HYDROCHLORIDE 10 MILLIGRAM(S): 2.5 TABLET ORAL at 17:25

## 2020-03-27 RX ADMIN — Medication 200 MILLIGRAM(S): at 02:57

## 2020-03-27 RX ADMIN — Medication 650 MILLIGRAM(S): at 16:13

## 2020-03-27 RX ADMIN — MIDODRINE HYDROCHLORIDE 10 MILLIGRAM(S): 2.5 TABLET ORAL at 11:53

## 2020-03-27 RX ADMIN — MIDODRINE HYDROCHLORIDE 10 MILLIGRAM(S): 2.5 TABLET ORAL at 02:57

## 2020-03-27 RX ADMIN — Medication 650 MILLIGRAM(S): at 15:12

## 2020-03-27 RX ADMIN — ENOXAPARIN SODIUM 40 MILLIGRAM(S): 100 INJECTION SUBCUTANEOUS at 15:14

## 2020-03-27 NOTE — DIETITIAN INITIAL EVALUATION ADULT. - PROBLEM SELECTOR PLAN 1
Patient presenting with tachypnea and fevers with source of URI, initially hypotensive in the ED  - Start Azithromycin 500 IV for anti-inflammatory properties as well as atypical pneumonia coverage  - Blood cultures x2  - Vital Signs q 4 hours  - 2 L bolus of IVF

## 2020-03-27 NOTE — DIETITIAN INITIAL EVALUATION ADULT. - OTHER INFO
Initial Dietitian Evaluation 2/2 to extended length of stay. Pt is a 54M with no past med-surg hx admitted to medicine for severe sepsis secondary to COVID19. Unable to conduct a face to face interview or nutrition-focused physical exam due to limited contact restrictions related to patient's medical condition and isolation precautions. Obtained subjective information from extensive chart review. RDN attempted to secure contact with patient- unsuccessful at this time. No reported difficulties chewing and swallowing; No GI distress (nausea/vomiting/diarrhea/constipation)- last BM 3/26.  Given nature of viral illness, PO intake likely suboptimal.   Suggest the provision of oral nutrition supplement - Ensure Enlive 240mls 2x daily (700kcal, 40g protein)- to meet pt's estimated energy and protein needs. RD remains available, re-consult as needed.

## 2020-03-27 NOTE — PROGRESS NOTE ADULT - SUBJECTIVE AND OBJECTIVE BOX
[facility] Division of Hospital Medicine  Ralph Jesus DO  Pager (RYAN, 8A-5P): [phone]    Patient is a 54y old  Male who presents with a chief complaint of Fevers and Cough (26 Mar 2020 12:57)    SUBJECTIVE / OVERNIGHT EVENTS: Pt is gradually feeling better, reports benefit with PRN morphine.  Has some appetite, but is mostly drinking.  Pt denies CP, N/V/D.  Remains afebrile.     MEDICATIONS  (STANDING):  hydroxychloroquine   Oral   hydroxychloroquine 200 milliGRAM(s) Oral every 12 hours  influenza   Vaccine 0.5 milliLiter(s) IntraMuscular once  midodrine. 10 milliGRAM(s) Oral three times a day    MEDICATIONS  (PRN):  acetaminophen   Tablet .. 650 milliGRAM(s) Oral every 6 hours PRN Temp greater or equal to 38.5C (101.3F), Mild Pain (1 - 3), Moderate Pain (4 - 6)  ALBUTerol    90 MICROgram(s) HFA Inhaler 1 Puff(s) Inhalation every 6 hours PRN Shortness of Breath  glycopyrrolate Injectable 0.2 milliGRAM(s) IV Push every 8 hours PRN secretions  morphine  - Injectable 1 milliGRAM(s) IV Push every 4 hours PRN dyspnea    I&O's Summary    26 Mar 2020 07:01  -  27 Mar 2020 07:00  --------------------------------------------------------  IN: 100 mL / OUT: 1200 mL / NET: -1100 mL    PHYSICAL EXAM:  Vital Signs Last 24 Hrs  T(C): 36.6 (27 Mar 2020 11:52), Max: 37.2 (26 Mar 2020 14:49)  T(F): 97.9 (27 Mar 2020 11:52), Max: 99 (26 Mar 2020 14:49)  HR: 56 (27 Mar 2020 08:53) (56 - 66)  BP: 109/59 (27 Mar 2020 11:52) (84/40 - 115/66)  BP(mean): --  RR: 18 (27 Mar 2020 11:52) (18 - 18)  SpO2: 95% (27 Mar 2020 11:52) (95% - 99%)    CONSTITUTIONAL: Mild respiratory distress, on 6L NC   RESPIRATORY: Mildly tachypneic, lungs are clear to auscultation bilaterally  CARDIOVASCULAR: Regular rate and rhythm, normal S1 and S2, no murmur/rub/gallop; No lower extremity edema  MUSCULOSKELETAL: No joint swelling or tenderness to palpation  PSYCH: A+O to person, place, and time; affect appropriate  NEUROLOGY: CN 2-12 are intact and symmetric; no gross motor/sensory deficits;     LABS:                        11.9   7.18  )-----------( 370      ( 26 Mar 2020 05:10 )             35.8     03-26    138  |  99  |  14  ----------------------------<  103<H>  4.2   |  27  |  0.71    Ca    9.3      26 Mar 2020 05:10  Phos  3.7     03-26  Mg     2.3     03-26    TPro  7.6  /  Alb  3.2<L>  /  TBili  1.3<H>  /  DBili  x   /  AST  87<H>  /  ALT  184<H>  /  AlkPhos  134<H>  03-26    COVID-19 PCR . (03.21.20 @ 16:40)    COVID-19 PCR: Detected: LDT - Laboratory Developed Test All “detected” results on this new test  are considered presumptively positive results, are clinically actionable,  and specimens will be forwarded to [facility] [place][note 2] for confirmation testing.  Another report (corrected report) will only be issued if discordant  results occur.  This test has been validated by [employer] to be accurate;  though it has not been FDA cleared/approved by the usual pathway.  As with all laboratory tests, results should be correlated with clinical  findings.    Sedimentation Rate, Erythrocyte (03.26.20 @ 05:10)    Sedimentation Rate, Erythrocyte: 102 mm/hr    C-Reactive Protein, Serum (03.26.20 @ 05:10)    C-Reactive Protein, Serum: 266.8 mg/L    Ferritin, Serum in AM (03.26.20 @ 05:10)    Ferritin, Serum: 1943 ng/mL    Procalcitonin, Serum (03.26.20 @ 05:10)    Procalcitonin, Serum: 0.15: Procalcitonin (PCT) Interpretation (ng/mL) - Diagnosis of  systemic bacterial infection/sepsis:  PCT < 0.5: Systemic infection (sepsis) is not likely and  risk for progression to severe systemic infection is low.  Local bacterial infection is possible. If early sepsis is  suspected clinically, PCT should be re-assessed in 6-24  hours.  PCT >/= 0.5 but < 2.0: Systemic infection (sepsis) is  possible, but other conditions are known to elevate PCT as  well. Moderate risk for progression to severe systemic  infection. The patient should be closely monitored both  clinically and by re-assessing PCT within 6-24 hours.  PCT >/= 2.0 but < 10.0: Systemic infection (sepsis) is  likely, unless other causes are known. High risk of  progression to severe systemic infection (severe  sepsis/septic shock).  PCT >/= 10.0: Important systemic inflammatory response,  almost exclusively due to severe bacterial sepsis or septic  shock. High likelihood of severe sepsis or septic shock. ng/mL      RADIOLOGY & ADDITIONAL TESTS:  Results Reviewed:   < from: Xray Chest 1 View AP/PA (03.21.20 @ 13:53) >    IMPRESSION:     Bilateral patchy opacities may be infectious

## 2020-03-27 NOTE — DIETITIAN INITIAL EVALUATION ADULT. - PERTINENT LABORATORY DATA
03-26 Na138 mmol/L Glu 103 mg/dL<H> K+ 4.2 mmol/L Cr  0.71 mg/dL BUN 14 mg/dL 03-26 Phos 3.7 mg/dL 03-26 Alb 3.2 g/dL<L>

## 2020-03-27 NOTE — DIETITIAN INITIAL EVALUATION ADULT. - ADD RECOMMEND
1- Continue current diet order, which remains appropriate at this time. Add Ensure Enlive 240mls 2x daily (700kcal, 40g protein). 2- Monitor weights, labs, BM's, skin integrity, p.o. intake. 3- Please Encourage po intake, assist with meals and menu selections, provide alternatives PRN. 4- RD remains available, re-consult as needed. Joey Knight RD Pager #[phone]

## 2020-03-27 NOTE — PROGRESS NOTE ADULT - PROBLEM SELECTOR PLAN 1
- Patient presented with tachypnea and fevers 2/2 COVID19. Pt now afebrile for past 72 hours  - Started hydroxychloroquine 3/23 given escalating O2 requirements, repeat EKG with QTc 420 (3/25)   - Blood cultures x2 negative  - UA/RVP negative, CXR with bilateral patchy opacities consistent with COVID infection

## 2020-03-27 NOTE — PROGRESS NOTE ADULT - PROBLEM SELECTOR PLAN 2
- COVID19 positive   - Albuterol inhaler prn for shortness of breath  - Avoid nebulizing treatments as this can aerosolize infection  - RVP negative  - Currently saturating well on 6L NC, continue to wean as tolerated  - Continue trending procalcitonin, CRP, ESR and Ferritin q48-72 hours for prognostication (next due 3/28)   - Continue PRN IV morphine for respiratory distress, pt finds much benefit with this medication

## 2020-03-27 NOTE — DIETITIAN INITIAL EVALUATION ADULT. - PERTINENT MEDS FT
MEDICATIONS  (STANDING):  hydroxychloroquine   Oral   hydroxychloroquine 200 milliGRAM(s) Oral every 12 hours  influenza   Vaccine 0.5 milliLiter(s) IntraMuscular once  midodrine. 10 milliGRAM(s) Oral three times a day

## 2020-03-28 LAB
ALBUMIN SERPL ELPH-MCNC: 2.8 G/DL — LOW (ref 3.3–5)
ALP SERPL-CCNC: 139 U/L — HIGH (ref 40–120)
ALT FLD-CCNC: 207 U/L — HIGH (ref 4–41)
ANION GAP SERPL CALC-SCNC: 13 MMO/L — SIGNIFICANT CHANGE UP (ref 7–14)
AST SERPL-CCNC: 95 U/L — HIGH (ref 4–40)
BASOPHILS # BLD AUTO: 0.01 K/UL — SIGNIFICANT CHANGE UP (ref 0–0.2)
BASOPHILS NFR BLD AUTO: 0.2 % — SIGNIFICANT CHANGE UP (ref 0–2)
BILIRUB SERPL-MCNC: 0.4 MG/DL — SIGNIFICANT CHANGE UP (ref 0.2–1.2)
BUN SERPL-MCNC: 13 MG/DL — SIGNIFICANT CHANGE UP (ref 7–23)
CALCIUM SERPL-MCNC: 8.8 MG/DL — SIGNIFICANT CHANGE UP (ref 8.4–10.5)
CHLORIDE SERPL-SCNC: 102 MMOL/L — SIGNIFICANT CHANGE UP (ref 98–107)
CO2 SERPL-SCNC: 21 MMOL/L — LOW (ref 22–31)
CREAT SERPL-MCNC: 0.63 MG/DL — SIGNIFICANT CHANGE UP (ref 0.5–1.3)
CRP SERPL-MCNC: 81.5 MG/L — HIGH
EOSINOPHIL # BLD AUTO: 0.05 K/UL — SIGNIFICANT CHANGE UP (ref 0–0.5)
EOSINOPHIL NFR BLD AUTO: 0.8 % — SIGNIFICANT CHANGE UP (ref 0–6)
ERYTHROCYTE [SEDIMENTATION RATE] IN BLOOD: 91 MM/HR — HIGH (ref 1–15)
FERRITIN SERPL-MCNC: 1167 NG/ML — HIGH (ref 30–400)
GLUCOSE SERPL-MCNC: 96 MG/DL — SIGNIFICANT CHANGE UP (ref 70–99)
HCT VFR BLD CALC: 37.4 % — LOW (ref 39–50)
HGB BLD-MCNC: 12.5 G/DL — LOW (ref 13–17)
IMM GRANULOCYTES NFR BLD AUTO: 1 % — SIGNIFICANT CHANGE UP (ref 0–1.5)
LYMPHOCYTES # BLD AUTO: 1.17 K/UL — SIGNIFICANT CHANGE UP (ref 1–3.3)
LYMPHOCYTES # BLD AUTO: 19.1 % — SIGNIFICANT CHANGE UP (ref 13–44)
MAGNESIUM SERPL-MCNC: 2.3 MG/DL — SIGNIFICANT CHANGE UP (ref 1.6–2.6)
MCHC RBC-ENTMCNC: 31.3 PG — SIGNIFICANT CHANGE UP (ref 27–34)
MCHC RBC-ENTMCNC: 33.4 % — SIGNIFICANT CHANGE UP (ref 32–36)
MCV RBC AUTO: 93.7 FL — SIGNIFICANT CHANGE UP (ref 80–100)
MONOCYTES # BLD AUTO: 0.51 K/UL — SIGNIFICANT CHANGE UP (ref 0–0.9)
MONOCYTES NFR BLD AUTO: 8.3 % — SIGNIFICANT CHANGE UP (ref 2–14)
NEUTROPHILS # BLD AUTO: 4.33 K/UL — SIGNIFICANT CHANGE UP (ref 1.8–7.4)
NEUTROPHILS NFR BLD AUTO: 70.6 % — SIGNIFICANT CHANGE UP (ref 43–77)
NRBC # FLD: 0 K/UL — SIGNIFICANT CHANGE UP (ref 0–0)
PLATELET # BLD AUTO: 484 K/UL — HIGH (ref 150–400)
PMV BLD: 10.3 FL — SIGNIFICANT CHANGE UP (ref 7–13)
POTASSIUM SERPL-MCNC: 4 MMOL/L — SIGNIFICANT CHANGE UP (ref 3.5–5.3)
POTASSIUM SERPL-SCNC: 4 MMOL/L — SIGNIFICANT CHANGE UP (ref 3.5–5.3)
PROCALCITONIN SERPL-MCNC: 0.11 NG/ML — HIGH (ref 0.02–0.1)
PROT SERPL-MCNC: 7.5 G/DL — SIGNIFICANT CHANGE UP (ref 6–8.3)
RBC # BLD: 3.99 M/UL — LOW (ref 4.2–5.8)
RBC # FLD: 12 % — SIGNIFICANT CHANGE UP (ref 10.3–14.5)
SODIUM SERPL-SCNC: 136 MMOL/L — SIGNIFICANT CHANGE UP (ref 135–145)
WBC # BLD: 6.13 K/UL — SIGNIFICANT CHANGE UP (ref 3.8–10.5)
WBC # FLD AUTO: 6.13 K/UL — SIGNIFICANT CHANGE UP (ref 3.8–10.5)

## 2020-03-28 PROCEDURE — 93010 ELECTROCARDIOGRAM REPORT: CPT

## 2020-03-28 PROCEDURE — 99233 SBSQ HOSP IP/OBS HIGH 50: CPT

## 2020-03-28 RX ORDER — SODIUM CHLORIDE 0.65 %
1 AEROSOL, SPRAY (ML) NASAL
Refills: 0 | Status: DISCONTINUED | OUTPATIENT
Start: 2020-03-28 | End: 2020-04-01

## 2020-03-28 RX ADMIN — Medication 200 MILLIGRAM(S): at 17:56

## 2020-03-28 RX ADMIN — Medication 1 SPRAY(S): at 21:50

## 2020-03-28 RX ADMIN — ENOXAPARIN SODIUM 40 MILLIGRAM(S): 100 INJECTION SUBCUTANEOUS at 11:40

## 2020-03-28 RX ADMIN — Medication 200 MILLIGRAM(S): at 02:57

## 2020-03-28 RX ADMIN — ROBINUL 0.2 MILLIGRAM(S): 0.2 INJECTION INTRAMUSCULAR; INTRAVENOUS at 11:44

## 2020-03-28 RX ADMIN — MIDODRINE HYDROCHLORIDE 10 MILLIGRAM(S): 2.5 TABLET ORAL at 02:59

## 2020-03-28 RX ADMIN — MIDODRINE HYDROCHLORIDE 10 MILLIGRAM(S): 2.5 TABLET ORAL at 11:41

## 2020-03-28 RX ADMIN — MIDODRINE HYDROCHLORIDE 10 MILLIGRAM(S): 2.5 TABLET ORAL at 17:47

## 2020-03-28 NOTE — PROGRESS NOTE ADULT - PROBLEM SELECTOR PLAN 4
Diet: Regular  DVT Prophylaxis: Lovenox daily for DVT ppx as pt nonambulatory and elevated risk of VTE COVID pts  Dispo: Pending resolution of symptoms

## 2020-03-28 NOTE — PROGRESS NOTE ADULT - SUBJECTIVE AND OBJECTIVE BOX
PROGRESS NOTE:     Patient is a 54y old  Male who presents with a chief complaint of Fevers and Cough (27 Mar 2020 11:54)      SUBJECTIVE / OVERNIGHT EVENTS:  Patient seen and examined this morning. Feels better and like he is improving.    ADDITIONAL REVIEW OF SYSTEMS:  Fevers subsiding, breathing improved.    MEDICATIONS  (STANDING):  enoxaparin Injectable 40 milliGRAM(s) SubCutaneous daily  influenza   Vaccine 0.5 milliLiter(s) IntraMuscular once  midodrine. 10 milliGRAM(s) Oral three times a day    MEDICATIONS  (PRN):  acetaminophen   Tablet .. 650 milliGRAM(s) Oral every 6 hours PRN Temp greater or equal to 38.5C (101.3F), Mild Pain (1 - 3), Moderate Pain (4 - 6)  ALBUTerol    90 MICROgram(s) HFA Inhaler 1 Puff(s) Inhalation every 6 hours PRN Shortness of Breath  glycopyrrolate Injectable 0.2 milliGRAM(s) IV Push every 8 hours PRN secretions  morphine  - Injectable 1 milliGRAM(s) IV Push every 4 hours PRN dyspnea      CAPILLARY BLOOD GLUCOSE        I&O's Summary    28 Mar 2020 07:01  -  28 Mar 2020 16:47  --------------------------------------------------------  IN: 0 mL / OUT: 1450 mL / NET: -1450 mL        PHYSICAL EXAM:  Vital Signs Last 24 Hrs  T(C): 36.6 (28 Mar 2020 11:38), Max: 37.2 (28 Mar 2020 02:55)  T(F): 97.9 (28 Mar 2020 11:38), Max: 98.9 (28 Mar 2020 02:55)  HR: 69 (28 Mar 2020 11:38) (54 - 69)  BP: 90/45 (28 Mar 2020 11:38) (90/45 - 107/68)  BP(mean): --  RR: 18 (28 Mar 2020 11:38) (18 - 19)  SpO2: 91% (28 Mar 2020 11:38) (91% - 97%)    CONSTITUTIONAL: on NC, comfortable  RESPIRATORY: Mildly tachypneic, lungs are clear to auscultation bilaterally  CARDIOVASCULAR: Regular rate and rhythm, normal S1 and S2, no murmur/rub/gallop; No lower extremity edema  MUSCULOSKELETAL: No joint swelling or tenderness to palpation  PSYCH: A+O to person, place, and time; affect appropriate  NEUROLOGY: CN 2-12 are intact and symmetric; no gross motor/sensory deficits;       LABS:                        12.5   6.13  )-----------( 484      ( 28 Mar 2020 05:54 )             37.4     03-28    136  |  102  |  13  ----------------------------<  96  4.0   |  21<L>  |  0.63    Ca    8.8      28 Mar 2020 05:54  Mg     2.3     03-28    TPro  7.5  /  Alb  2.8<L>  /  TBili  0.4  /  DBili  x   /  AST  95<H>  /  ALT  207<H>  /  AlkPhos  139<H>  03-28                RADIOLOGY & ADDITIONAL TESTS:  Results Reviewed:   Imaging Personally Reviewed:  Electrocardiogram Personally Reviewed:    COORDINATION OF CARE:  Care Discussed with Consultants/Other Providers [Y/N]:  Prior or Outpatient Records Reviewed [Y/N]:

## 2020-03-29 DIAGNOSIS — E87.1 HYPO-OSMOLALITY AND HYPONATREMIA: ICD-10-CM

## 2020-03-29 DIAGNOSIS — R74.0 NONSPECIFIC ELEVATION OF LEVELS OF TRANSAMINASE AND LACTIC ACID DEHYDROGENASE [LDH]: ICD-10-CM

## 2020-03-29 LAB
ALBUMIN SERPL ELPH-MCNC: 3 G/DL — LOW (ref 3.3–5)
ALP SERPL-CCNC: 131 U/L — HIGH (ref 40–120)
ALT FLD-CCNC: 194 U/L — HIGH (ref 4–41)
ANION GAP SERPL CALC-SCNC: 14 MMO/L — SIGNIFICANT CHANGE UP (ref 7–14)
AST SERPL-CCNC: 83 U/L — HIGH (ref 4–40)
BASOPHILS # BLD AUTO: 0.03 K/UL — SIGNIFICANT CHANGE UP (ref 0–0.2)
BASOPHILS NFR BLD AUTO: 0.6 % — SIGNIFICANT CHANGE UP (ref 0–2)
BILIRUB SERPL-MCNC: 0.3 MG/DL — SIGNIFICANT CHANGE UP (ref 0.2–1.2)
BUN SERPL-MCNC: 15 MG/DL — SIGNIFICANT CHANGE UP (ref 7–23)
CALCIUM SERPL-MCNC: 9 MG/DL — SIGNIFICANT CHANGE UP (ref 8.4–10.5)
CHLORIDE SERPL-SCNC: 96 MMOL/L — LOW (ref 98–107)
CO2 SERPL-SCNC: 23 MMOL/L — SIGNIFICANT CHANGE UP (ref 22–31)
CREAT SERPL-MCNC: 0.67 MG/DL — SIGNIFICANT CHANGE UP (ref 0.5–1.3)
EOSINOPHIL # BLD AUTO: 0.05 K/UL — SIGNIFICANT CHANGE UP (ref 0–0.5)
EOSINOPHIL NFR BLD AUTO: 1 % — SIGNIFICANT CHANGE UP (ref 0–6)
GLUCOSE SERPL-MCNC: 97 MG/DL — SIGNIFICANT CHANGE UP (ref 70–99)
HCT VFR BLD CALC: 37.5 % — LOW (ref 39–50)
HGB BLD-MCNC: 12.5 G/DL — LOW (ref 13–17)
IMM GRANULOCYTES NFR BLD AUTO: 1.2 % — SIGNIFICANT CHANGE UP (ref 0–1.5)
LYMPHOCYTES # BLD AUTO: 1.3 K/UL — SIGNIFICANT CHANGE UP (ref 1–3.3)
LYMPHOCYTES # BLD AUTO: 25.4 % — SIGNIFICANT CHANGE UP (ref 13–44)
MAGNESIUM SERPL-MCNC: 2.4 MG/DL — SIGNIFICANT CHANGE UP (ref 1.6–2.6)
MCHC RBC-ENTMCNC: 30.9 PG — SIGNIFICANT CHANGE UP (ref 27–34)
MCHC RBC-ENTMCNC: 33.3 % — SIGNIFICANT CHANGE UP (ref 32–36)
MCV RBC AUTO: 92.8 FL — SIGNIFICANT CHANGE UP (ref 80–100)
MONOCYTES # BLD AUTO: 0.51 K/UL — SIGNIFICANT CHANGE UP (ref 0–0.9)
MONOCYTES NFR BLD AUTO: 10 % — SIGNIFICANT CHANGE UP (ref 2–14)
NEUTROPHILS # BLD AUTO: 3.17 K/UL — SIGNIFICANT CHANGE UP (ref 1.8–7.4)
NEUTROPHILS NFR BLD AUTO: 61.8 % — SIGNIFICANT CHANGE UP (ref 43–77)
NRBC # FLD: 0 K/UL — SIGNIFICANT CHANGE UP (ref 0–0)
PHOSPHATE SERPL-MCNC: 3.7 MG/DL — SIGNIFICANT CHANGE UP (ref 2.5–4.5)
PLATELET # BLD AUTO: 524 K/UL — HIGH (ref 150–400)
PMV BLD: 10 FL — SIGNIFICANT CHANGE UP (ref 7–13)
POTASSIUM SERPL-MCNC: 4.2 MMOL/L — SIGNIFICANT CHANGE UP (ref 3.5–5.3)
POTASSIUM SERPL-SCNC: 4.2 MMOL/L — SIGNIFICANT CHANGE UP (ref 3.5–5.3)
PROT SERPL-MCNC: 7.8 G/DL — SIGNIFICANT CHANGE UP (ref 6–8.3)
RBC # BLD: 4.04 M/UL — LOW (ref 4.2–5.8)
RBC # FLD: 11.9 % — SIGNIFICANT CHANGE UP (ref 10.3–14.5)
SODIUM SERPL-SCNC: 133 MMOL/L — LOW (ref 135–145)
WBC # BLD: 5.12 K/UL — SIGNIFICANT CHANGE UP (ref 3.8–10.5)
WBC # FLD AUTO: 5.12 K/UL — SIGNIFICANT CHANGE UP (ref 3.8–10.5)

## 2020-03-29 PROCEDURE — 99233 SBSQ HOSP IP/OBS HIGH 50: CPT

## 2020-03-29 RX ORDER — ACETAMINOPHEN 500 MG
650 TABLET ORAL EVERY 6 HOURS
Refills: 0 | Status: DISCONTINUED | OUTPATIENT
Start: 2020-03-29 | End: 2020-04-01

## 2020-03-29 RX ADMIN — ENOXAPARIN SODIUM 40 MILLIGRAM(S): 100 INJECTION SUBCUTANEOUS at 11:18

## 2020-03-29 RX ADMIN — MIDODRINE HYDROCHLORIDE 10 MILLIGRAM(S): 2.5 TABLET ORAL at 05:47

## 2020-03-29 RX ADMIN — Medication 1 SPRAY(S): at 21:56

## 2020-03-29 RX ADMIN — MIDODRINE HYDROCHLORIDE 10 MILLIGRAM(S): 2.5 TABLET ORAL at 11:19

## 2020-03-29 RX ADMIN — MIDODRINE HYDROCHLORIDE 10 MILLIGRAM(S): 2.5 TABLET ORAL at 18:40

## 2020-03-29 NOTE — PROGRESS NOTE ADULT - PROBLEM SELECTOR PLAN 5
- Pt is full code.  Pt is aware that his wife is currently intubated in the ICU and is critically ill.  Pt informed that daughter is doing well.   - Pt has appointed his eldest son (varun) as primary decision maker and his sister in law Demetria (numbers in chart).  - Palliative care also following closely -not present on admission  -could be secondary to viral infection vs hypotension  -continue to monitor and avoid hepatotoxins. Will adjust tylenol order however in past 48 hours received only one dose.

## 2020-03-29 NOTE — PROGRESS NOTE ADULT - SUBJECTIVE AND OBJECTIVE BOX
PROGRESS NOTE:   Authored by Dr. Radha Ruff Pager [phone]    Patient is a 54y old  Male who presents with a chief complaint of Fevers and Cough (28 Mar 2020 16:46)      SUBJECTIVE / OVERNIGHT EVENTS: no overnight events. He states he has abdominal muscular pain when he coughs and mild back pain.     ADDITIONAL REVIEW OF SYSTEMS:  Denies CP, endorses cough, endorses shortness of breath.     MEDICATIONS  (STANDING):  enoxaparin Injectable 40 milliGRAM(s) SubCutaneous daily  influenza   Vaccine 0.5 milliLiter(s) IntraMuscular once  midodrine. 10 milliGRAM(s) Oral three times a day    MEDICATIONS  (PRN):  acetaminophen   Tablet .. 650 milliGRAM(s) Oral every 6 hours PRN Temp greater or equal to 38.5C (101.3F), Mild Pain (1 - 3), Moderate Pain (4 - 6)  ALBUTerol    90 MICROgram(s) HFA Inhaler 1 Puff(s) Inhalation every 6 hours PRN Shortness of Breath  glycopyrrolate Injectable 0.2 milliGRAM(s) IV Push every 8 hours PRN secretions  morphine  - Injectable 1 milliGRAM(s) IV Push every 4 hours PRN dyspnea  sodium chloride 0.65% Nasal 1 Spray(s) Both Nostrils five times a day PRN Nasal Congestion      CAPILLARY BLOOD GLUCOSE      POCT Blood Glucose.: 102 mg/dL (29 Mar 2020 09:01)    I&O's Summary    28 Mar 2020 07:01  -  29 Mar 2020 07:00  --------------------------------------------------------  IN: 0 mL / OUT: 1450 mL / NET: -1450 mL        PHYSICAL EXAM:  Vital Signs Last 24 Hrs  T(C): 36.6 (29 Mar 2020 05:41), Max: 37.3 (28 Mar 2020 17:45)  T(F): 97.9 (29 Mar 2020 05:41), Max: 99.1 (28 Mar 2020 17:45)  HR: 61 (29 Mar 2020 05:41) (61 - 69)  BP: 82/52 (29 Mar 2020 05:41) (82/52 - 96/60)  BP(mean): --  RR: 18 (29 Mar 2020 05:41) (18 - 18)  SpO2: 95% (29 Mar 2020 05:41) (91% - 95%)    CONSTITUTIONAL: NAD, well-developed  RESPIRATORY: Tachypneic, crackles present bilaterally and more pronounced at apices  CARDIOVASCULAR: Regular rate and rhythm, normal S1 and S2, no murmur/rub/gallop; No lower extremity edema; Peripheral pulses are 2+ bilaterally  BACK: no pain on palpation. no midline pain. skin tag present on back. no rash  ABDOMEN: Nontender to palpation, normoactive bowel sounds, no rebound/guarding; No hepatosplenomegaly  MUSCLOSKELETAL: no clubbing or cyanosis of digits; no joint swelling or tenderness to palpation  PSYCH: A+O to person, place, and time; affect appropriate    LABS:                        12.5   5.12  )-----------( 524      ( 29 Mar 2020 05:33 )             37.5     03-29    133<L>  |  96<L>  |  15  ----------------------------<  97  4.2   |  23  |  0.67    Ca    9.0      29 Mar 2020 06:00  Phos  3.7     03-29  Mg     2.4     03-29    TPro  7.8  /  Alb  3.0<L>  /  TBili  0.3  /  DBili  x   /  AST  83<H>  /  ALT  194<H>  /  AlkPhos  131<H>  03-29                RADIOLOGY & ADDITIONAL TESTS:  Results Reviewed:   Imaging Personally Reviewed:  Electrocardiogram Personally Reviewed:    COORDINATION OF CARE:  Care Discussed with Consultants/Other Providers [Y/N]:  Prior or Outpatient Records Reviewed [Y/N]:

## 2020-03-30 PROCEDURE — 99232 SBSQ HOSP IP/OBS MODERATE 35: CPT

## 2020-03-30 PROCEDURE — 99233 SBSQ HOSP IP/OBS HIGH 50: CPT

## 2020-03-30 RX ORDER — MIDODRINE HYDROCHLORIDE 2.5 MG/1
5 TABLET ORAL THREE TIMES A DAY
Refills: 0 | Status: DISCONTINUED | OUTPATIENT
Start: 2020-03-30 | End: 2020-04-01

## 2020-03-30 RX ADMIN — MIDODRINE HYDROCHLORIDE 5 MILLIGRAM(S): 2.5 TABLET ORAL at 18:07

## 2020-03-30 RX ADMIN — MIDODRINE HYDROCHLORIDE 10 MILLIGRAM(S): 2.5 TABLET ORAL at 11:13

## 2020-03-30 RX ADMIN — MIDODRINE HYDROCHLORIDE 10 MILLIGRAM(S): 2.5 TABLET ORAL at 06:14

## 2020-03-30 RX ADMIN — ENOXAPARIN SODIUM 40 MILLIGRAM(S): 100 INJECTION SUBCUTANEOUS at 11:13

## 2020-03-30 NOTE — PROGRESS NOTE ADULT - PROBLEM SELECTOR PLAN 1
- Patient presented with tachypnea and fevers 2/2 COVID19. Pt now afebrile for past 72 hours  - Started hydroxychloroquine 3/23 given escalating O2 requirements, repeat EKG with QTc 420 (3/25)   - Blood cultures x2 negative  - UA/RVP negative, CXR with bilateral patchy opacities consistent with COVID 19 infection

## 2020-03-30 NOTE — PROGRESS NOTE ADULT - PROBLEM SELECTOR PLAN 3
- Refer to goals of care note  - Update given to jae Eastman about families medical status. They continue to have communication with pt via text messages.

## 2020-03-30 NOTE — PROGRESS NOTE ADULT - SUBJECTIVE AND OBJECTIVE BOX
*** See physical exam from prior primary team, due to limited exam as pt is COVID 19 + ***    SUBJECTIVE AND OBJECTIVE:  Pt was reported doing better, on 6L nasal cannula.     INTERVAL HPI/OVERNIGHT EVENTS:  Pt received BTD x 1 of morphine in past 24 hrs.     DNR on chart: full code  Allergies    No Known Allergies    Intolerances    MEDICATIONS  (STANDING):  enoxaparin Injectable 40 milliGRAM(s) SubCutaneous daily  influenza   Vaccine 0.5 milliLiter(s) IntraMuscular once  midodrine. 10 milliGRAM(s) Oral three times a day    MEDICATIONS  (PRN):  acetaminophen   Tablet .. 650 milliGRAM(s) Oral every 6 hours PRN Temp greater or equal to 38.5C (101.3F), Mild Pain (1 - 3), Moderate Pain (4 - 6)  ALBUTerol    90 MICROgram(s) HFA Inhaler 1 Puff(s) Inhalation every 6 hours PRN Shortness of Breath  morphine  - Injectable 1 milliGRAM(s) IV Push every 4 hours PRN dyspnea  sodium chloride 0.65% Nasal 1 Spray(s) Both Nostrils five times a day PRN Nasal Congestion    ITEMS UNCHECKED ARE NOT PRESENT    PRESENT SYMPTOMS: [ ]Unable to obtain due to poor mentation   Source if other than patient:  [ ]Family   [ ]Team     Pain (Impact on QOL):  denies  Location:  Minimal acceptable level (0-10 scale):            Aggravating factors:  Quality:  Radiation:  Severity (0-10 scale):    Timing:    Dyspnea:                           [x ]Mild [ ]Moderate [ ]Severe  Anxiety:                             [ ]Mild [ ]Moderate [ ]Severe  Fatigue:                             [ ]Mild [ ]Moderate [ ]Severe  Nausea:                             [ ]Mild [ ]Moderate [ ]Severe  Loss of appetite:              [ ]Mild [ ]Moderate [ ]Severe  Constipation:                    [ ]Mild [ ]Moderate [ ]Severe    PAIN AD Score:	  [email or web address].[email or web address]/painad.pdf (Ctrl + left click to view)    Other Symptoms:  [x ]All other review of systems negative     Karnofsky Performance Score/Palliative Performance Status Version 2:   50 %    http://palliative.info/resource_material/PPSv2.pdf    PHYSICAL EXAM:  *** See physical exam from prior primary team, due to limited exam as pt is COVID 19 + ***    PHYSICAL EXAM:  Vital Signs Last 24 Hrs  T(C): 37 (30 Mar 2020 11:10), Max: 37.1 (30 Mar 2020 06:12)  T(F): 98.6 (30 Mar 2020 11:10), Max: 98.8 (30 Mar 2020 06:12)  HR: 65 (30 Mar 2020 11:10) (63 - 73)  BP: 89/56 (30 Mar 2020 11:10) (89/56 - 102/51)  BP(mean): --  RR: 18 (30 Mar 2020 11:10) (18 - 18)  SpO2: 93% (30 Mar 2020 11:10) (92% - 95%) I&O's Summary    29 Mar 2020 07:01  -  30 Mar 2020 07:00  --------------------------------------------------------  IN: 250 mL / OUT: 700 mL / NET: -450 mL     GENERAL:  [ ]Alert  [ ]Oriented x   [ ]Lethargic  [ ]Cachexia  [ ]Unarousable  [ ]Verbal  [ ]Non-Verbal    Behavioral:   [ ] Anxiety  [ ] Delirium [ ] Agitation [ ] Other    HEENT:  [ ]Normal   [ ]Dry mouth   [ ]ET Tube/Trach  [ ]Oral lesions    PULMONARY:   [ ]Clear [ ]Tachypnea  [ ]Audible excessive secretions   [ ]Rhonchi        [ ]Right [ ]Left [ ]Bilateral  [ ]Crackles        [ ]Right [ ]Left [ ]Bilateral  [ ]Wheezing     [ ]Right [ ]Left [ ]Bilateral    CARDIOVASCULAR:    [ ]Regular [ ]Irregular [ ]Tachy  [ ]Sohan [ ]Murmur [ ]Other    GASTROINTESTINAL:  [ ]Soft  [ ]Distended   [ ]+BS  [ ]Non tender [ ]Tender  [ ]PEG [ ]OGT/ NGT   Last BM:    GENITOURINARY:  [ ]Normal [ ] Incontinent   [ ]Oliguria/Anuria   [ ][place]    MUSCULOSKELETAL:   [ ]Normal   [ ]Weakness  [ ]Bed/Wheelchair bound [ ]Edema    NEUROLOGIC:   [ ]No focal deficits  [ ] Cognitive impairment  [ ] Dysphagia [ ]Dysarthria [ ] Paresis [ ]Other     SKIN:   [ ]Normal   [ ]Pressure ulcer(s)  [ ]Rash    CRITICAL CARE:  [ ] Shock Present  [ ]Septic [ ]Cardiogenic [ ]Neurologic [ ]Hypovolemic  [ ]  Vasopressors [ ]  Inotropes   [ ] Respiratory failure present  [ ] Acute  [ ] Chronic [ ] Hypoxic  [ ] Hypercarbic [ ] Other  [ ] Other organ failure     LABS:                        12.5   5.12  )-----------( 524      ( 29 Mar 2020 05:33 )             37.5     03-29    133<L>  |  96<L>  |  15  ----------------------------<  97  4.2   |  23  |  0.67    Ca    9.0      29 Mar 2020 06:00  Phos  3.7     03-29  Mg     2.4     03-29    TPro  7.8  /  Alb  3.0<L>  /  TBili  0.3  /  DBili  x   /  AST  83<H>  /  ALT  194<H>  /  AlkPhos  131<H>  03-29    RADIOLOGY & ADDITIONAL STUDIES: reviewed    no recent imaging done    Protein Calorie Malnutrition Present: [ ] yes [ ] no  [ ] PPSV2 < or = 30%  [ ] significant weight loss [ ] poor nutritional intake [ ] anasarca [ ] catabolic state Albumin, Serum: 3.2 g/dL (03-26-20 @ 05:10)  Artificial Nutrition [ ]     REFERRALS:   [ ]Chaplaincy  [ ] Hospice  [ ]Child Life  [ ]Social Work  [ ]Case management [ ]Holistic Therapy   Goals of Care Document: KASSI Moran (03-25-20 @ 15:00)  Goals of Care Conversation:  please refer to [facility] note

## 2020-03-30 NOTE — PROGRESS NOTE ADULT - PROBLEM SELECTOR PLAN 1
- COVID19 positive   - Albuterol inhaler prn for shortness of breath  - Pt now tolerating 6L nasal cannula  - Reports feeling better

## 2020-03-30 NOTE — PROGRESS NOTE ADULT - ASSESSMENT
54 y.o. Male w/ Hx Depression a/w severe sepsis secondary to COVID19 and acute hypoxic respiratory failure.

## 2020-03-30 NOTE — PROGRESS NOTE ADULT - PROBLEM SELECTOR PLAN 5
-mild  -likely related to poor PO intake  -hold off on IV fluids given COVID infection and hypoxia  -continue to monitor

## 2020-03-30 NOTE — PROGRESS NOTE ADULT - PROBLEM SELECTOR PLAN 2
- COVID19 positive   - Albuterol inhaler prn for shortness of breath  - Avoid nebulizing treatments as this can aerosolize infection  - RVP negative  - Continue to wean off oxygen as tolerated  - Continue trending procalcitonin, CRP, ESR and Ferritin q48-72 hours for prognostication (next due 3/28)   - Continue PRN IV morphine for respiratory distress, pt finds much benefit with this medication
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
## 2020-03-30 NOTE — PROGRESS NOTE ADULT - PROBLEM SELECTOR PLAN 4
-not present on admission  -could be secondary to viral infection vs hypotension  -continue to monitor and avoid hepatotoxins.

## 2020-03-31 LAB
ALBUMIN SERPL ELPH-MCNC: 3.2 G/DL — LOW (ref 3.3–5)
ALP SERPL-CCNC: 123 U/L — HIGH (ref 40–120)
ALT FLD-CCNC: 205 U/L — HIGH (ref 4–41)
ANION GAP SERPL CALC-SCNC: 14 MMO/L — SIGNIFICANT CHANGE UP (ref 7–14)
AST SERPL-CCNC: 81 U/L — HIGH (ref 4–40)
BASOPHILS # BLD AUTO: 0.02 K/UL — SIGNIFICANT CHANGE UP (ref 0–0.2)
BASOPHILS NFR BLD AUTO: 0.2 % — SIGNIFICANT CHANGE UP (ref 0–2)
BILIRUB SERPL-MCNC: 0.5 MG/DL — SIGNIFICANT CHANGE UP (ref 0.2–1.2)
BUN SERPL-MCNC: 16 MG/DL — SIGNIFICANT CHANGE UP (ref 7–23)
CALCIUM SERPL-MCNC: 9.3 MG/DL — SIGNIFICANT CHANGE UP (ref 8.4–10.5)
CHLORIDE SERPL-SCNC: 99 MMOL/L — SIGNIFICANT CHANGE UP (ref 98–107)
CO2 SERPL-SCNC: 21 MMOL/L — LOW (ref 22–31)
CREAT SERPL-MCNC: 0.65 MG/DL — SIGNIFICANT CHANGE UP (ref 0.5–1.3)
CRP SERPL-MCNC: 23.2 MG/L — HIGH
EOSINOPHIL # BLD AUTO: 0.04 K/UL — SIGNIFICANT CHANGE UP (ref 0–0.5)
EOSINOPHIL NFR BLD AUTO: 0.5 % — SIGNIFICANT CHANGE UP (ref 0–6)
ERYTHROCYTE [SEDIMENTATION RATE] IN BLOOD: 90 MM/HR — HIGH (ref 1–15)
FERRITIN SERPL-MCNC: 668.4 NG/ML — HIGH (ref 30–400)
GLUCOSE SERPL-MCNC: 111 MG/DL — HIGH (ref 70–99)
HCT VFR BLD CALC: 37.8 % — LOW (ref 39–50)
HGB BLD-MCNC: 12.7 G/DL — LOW (ref 13–17)
IMM GRANULOCYTES NFR BLD AUTO: 0.5 % — SIGNIFICANT CHANGE UP (ref 0–1.5)
LYMPHOCYTES # BLD AUTO: 1.78 K/UL — SIGNIFICANT CHANGE UP (ref 1–3.3)
LYMPHOCYTES # BLD AUTO: 21.5 % — SIGNIFICANT CHANGE UP (ref 13–44)
MAGNESIUM SERPL-MCNC: 2.4 MG/DL — SIGNIFICANT CHANGE UP (ref 1.6–2.6)
MCHC RBC-ENTMCNC: 31.1 PG — SIGNIFICANT CHANGE UP (ref 27–34)
MCHC RBC-ENTMCNC: 33.6 % — SIGNIFICANT CHANGE UP (ref 32–36)
MCV RBC AUTO: 92.4 FL — SIGNIFICANT CHANGE UP (ref 80–100)
MONOCYTES # BLD AUTO: 0.67 K/UL — SIGNIFICANT CHANGE UP (ref 0–0.9)
MONOCYTES NFR BLD AUTO: 8.1 % — SIGNIFICANT CHANGE UP (ref 2–14)
NEUTROPHILS # BLD AUTO: 5.72 K/UL — SIGNIFICANT CHANGE UP (ref 1.8–7.4)
NEUTROPHILS NFR BLD AUTO: 69.2 % — SIGNIFICANT CHANGE UP (ref 43–77)
NRBC # FLD: 0 K/UL — SIGNIFICANT CHANGE UP (ref 0–0)
PHOSPHATE SERPL-MCNC: 3.8 MG/DL — SIGNIFICANT CHANGE UP (ref 2.5–4.5)
PLATELET # BLD AUTO: 581 K/UL — HIGH (ref 150–400)
PMV BLD: 9.7 FL — SIGNIFICANT CHANGE UP (ref 7–13)
POTASSIUM SERPL-MCNC: 3.9 MMOL/L — SIGNIFICANT CHANGE UP (ref 3.5–5.3)
POTASSIUM SERPL-SCNC: 3.9 MMOL/L — SIGNIFICANT CHANGE UP (ref 3.5–5.3)
PROCALCITONIN SERPL-MCNC: 0.07 NG/ML — SIGNIFICANT CHANGE UP (ref 0.02–0.1)
PROT SERPL-MCNC: 8 G/DL — SIGNIFICANT CHANGE UP (ref 6–8.3)
RBC # BLD: 4.09 M/UL — LOW (ref 4.2–5.8)
RBC # FLD: 11.8 % — SIGNIFICANT CHANGE UP (ref 10.3–14.5)
SODIUM SERPL-SCNC: 134 MMOL/L — LOW (ref 135–145)
WBC # BLD: 8.27 K/UL — SIGNIFICANT CHANGE UP (ref 3.8–10.5)
WBC # FLD AUTO: 8.27 K/UL — SIGNIFICANT CHANGE UP (ref 3.8–10.5)

## 2020-03-31 PROCEDURE — 99233 SBSQ HOSP IP/OBS HIGH 50: CPT

## 2020-03-31 RX ADMIN — MIDODRINE HYDROCHLORIDE 5 MILLIGRAM(S): 2.5 TABLET ORAL at 05:16

## 2020-03-31 RX ADMIN — MIDODRINE HYDROCHLORIDE 5 MILLIGRAM(S): 2.5 TABLET ORAL at 17:14

## 2020-03-31 RX ADMIN — MIDODRINE HYDROCHLORIDE 5 MILLIGRAM(S): 2.5 TABLET ORAL at 11:34

## 2020-03-31 RX ADMIN — ENOXAPARIN SODIUM 40 MILLIGRAM(S): 100 INJECTION SUBCUTANEOUS at 11:33

## 2020-03-31 NOTE — PROGRESS NOTE ADULT - SUBJECTIVE AND OBJECTIVE BOX
Patient is a 54y old  Male who presents with a chief complaint of Fevers and Cough (30 Mar 2020 15:06)      SUBJECTIVE / OVERNIGHT EVENTS:  Patient has no new complaints. Denies cp, SOB, abdominal pain, N/V/D     MEDICATIONS  (STANDING):  enoxaparin Injectable 40 milliGRAM(s) SubCutaneous daily  influenza   Vaccine 0.5 milliLiter(s) IntraMuscular once  midodrine. 5 milliGRAM(s) Oral three times a day    MEDICATIONS  (PRN):  acetaminophen   Tablet .. 650 milliGRAM(s) Oral every 6 hours PRN Temp greater or equal to 38.5C (101.3F), Mild Pain (1 - 3), Moderate Pain (4 - 6)  ALBUTerol    90 MICROgram(s) HFA Inhaler 1 Puff(s) Inhalation every 6 hours PRN Shortness of Breath  morphine  - Injectable 1 milliGRAM(s) IV Push every 4 hours PRN dyspnea  sodium chloride 0.65% Nasal 1 Spray(s) Both Nostrils five times a day PRN Nasal Congestion      Vital Signs Last 24 Hrs  T(C): 36.9 (31 Mar 2020 11:30), Max: 36.9 (30 Mar 2020 21:40)  T(F): 98.5 (31 Mar 2020 11:30), Max: 98.5 (30 Mar 2020 21:40)  HR: 72 (31 Mar 2020 11:30) (72 - 96)  BP: 101/58 (31 Mar 2020 11:30) (101/58 - 106/68)  BP(mean): --  RR: 18 (31 Mar 2020 11:30) (18 - 18)  SpO2: 93% (31 Mar 2020 11:30) (91% - 95%)  CAPILLARY BLOOD GLUCOSE        I&O's Summary      PHYSICAL EXAM:  GENERAL: NAD, well-developed  HEAD:  Atraumatic, Normocephalic  EYES: EOMI, PERRLA, conjunctiva and sclera clear  NECK: Supple, No JVD  CHEST/LUNG: Clear to auscultation bilaterally; No wheeze  HEART: Regular rate and rhythm; No murmurs, rubs, or gallops  ABDOMEN: Soft, Nontender, Nondistended; Bowel sounds present  EXTREMITIES:  2+ Peripheral Pulses, No clubbing, cyanosis, or edema  PSYCH: AAOx3  NEUROLOGY: non-focal  SKIN: No rashes or lesions    LABS:                        12.7   8.27  )-----------( 581      ( 31 Mar 2020 06:00 )             37.8     03-31    134<L>  |  99  |  16  ----------------------------<  111<H>  3.9   |  21<L>  |  0.65    Ca    9.3      31 Mar 2020 06:00  Phos  3.8     03-31  Mg     2.4     03-31    TPro  8.0  /  Alb  3.2<L>  /  TBili  0.5  /  DBili  x   /  AST  81<H>  /  ALT  205<H>  /  AlkPhos  123<H>  03-31              RADIOLOGY & ADDITIONAL TESTS:    Imaging Personally Reviewed:    Consultant(s) Notes Reviewed:      Care Discussed with Consultants/Other Providers:

## 2020-04-01 ENCOUNTER — TRANSCRIPTION ENCOUNTER (OUTPATIENT)
Age: 55
End: 2020-04-01

## 2020-04-01 VITALS — RESPIRATION RATE: 18 BRPM | OXYGEN SATURATION: 92 %

## 2020-04-01 LAB
ANION GAP SERPL CALC-SCNC: 15 MMO/L — HIGH (ref 7–14)
BUN SERPL-MCNC: 15 MG/DL — SIGNIFICANT CHANGE UP (ref 7–23)
CALCIUM SERPL-MCNC: 9 MG/DL — SIGNIFICANT CHANGE UP (ref 8.4–10.5)
CHLORIDE SERPL-SCNC: 100 MMOL/L — SIGNIFICANT CHANGE UP (ref 98–107)
CO2 SERPL-SCNC: 19 MMOL/L — LOW (ref 22–31)
CREAT SERPL-MCNC: 0.64 MG/DL — SIGNIFICANT CHANGE UP (ref 0.5–1.3)
CRP SERPL-MCNC: 20.3 MG/L — HIGH
D DIMER BLD IA.RAPID-MCNC: 507 NG/ML — SIGNIFICANT CHANGE UP
ERYTHROCYTE [SEDIMENTATION RATE] IN BLOOD: 98 MM/HR — HIGH (ref 1–15)
FERRITIN SERPL-MCNC: 576.7 NG/ML — HIGH (ref 30–400)
GLUCOSE SERPL-MCNC: 97 MG/DL — SIGNIFICANT CHANGE UP (ref 70–99)
HCT VFR BLD CALC: 36.4 % — LOW (ref 39–50)
HGB BLD-MCNC: 12.4 G/DL — LOW (ref 13–17)
MAGNESIUM SERPL-MCNC: 2.4 MG/DL — SIGNIFICANT CHANGE UP (ref 1.6–2.6)
MCHC RBC-ENTMCNC: 31.7 PG — SIGNIFICANT CHANGE UP (ref 27–34)
MCHC RBC-ENTMCNC: 34.1 % — SIGNIFICANT CHANGE UP (ref 32–36)
MCV RBC AUTO: 93.1 FL — SIGNIFICANT CHANGE UP (ref 80–100)
NRBC # FLD: 0 K/UL — SIGNIFICANT CHANGE UP (ref 0–0)
PLATELET # BLD AUTO: 561 K/UL — HIGH (ref 150–400)
PMV BLD: 10.2 FL — SIGNIFICANT CHANGE UP (ref 7–13)
POTASSIUM SERPL-MCNC: 4.9 MMOL/L — SIGNIFICANT CHANGE UP (ref 3.5–5.3)
POTASSIUM SERPL-SCNC: 4.9 MMOL/L — SIGNIFICANT CHANGE UP (ref 3.5–5.3)
RBC # BLD: 3.91 M/UL — LOW (ref 4.2–5.8)
RBC # FLD: 12 % — SIGNIFICANT CHANGE UP (ref 10.3–14.5)
SODIUM SERPL-SCNC: 134 MMOL/L — LOW (ref 135–145)
WBC # BLD: 8.2 K/UL — SIGNIFICANT CHANGE UP (ref 3.8–10.5)
WBC # FLD AUTO: 8.2 K/UL — SIGNIFICANT CHANGE UP (ref 3.8–10.5)

## 2020-04-01 PROCEDURE — 99239 HOSP IP/OBS DSCHRG MGMT >30: CPT

## 2020-04-01 PROCEDURE — 99233 SBSQ HOSP IP/OBS HIGH 50: CPT

## 2020-04-01 RX ORDER — GUAIFENESIN/DEXTROMETHORPHAN 600MG-30MG
10 TABLET, EXTENDED RELEASE 12 HR ORAL
Qty: 420 | Refills: 0
Start: 2020-04-01

## 2020-04-01 RX ORDER — ACETAMINOPHEN 500 MG
1 TABLET ORAL
Qty: 90 | Refills: 0
Start: 2020-04-01 | End: 2020-04-30

## 2020-04-01 RX ORDER — ACETAMINOPHEN 500 MG
2 TABLET ORAL
Qty: 0 | Refills: 0 | DISCHARGE
Start: 2020-04-01

## 2020-04-01 RX ORDER — GUAIFENESIN/DEXTROMETHORPHAN 600MG-30MG
30 TABLET, EXTENDED RELEASE 12 HR ORAL EVERY 6 HOURS
Refills: 0 | Status: DISCONTINUED | OUTPATIENT
Start: 2020-04-01 | End: 2020-04-01

## 2020-04-01 RX ORDER — GUAIFENESIN/DEXTROMETHORPHAN 600MG-30MG
10 TABLET, EXTENDED RELEASE 12 HR ORAL
Qty: 0 | Refills: 0 | DISCHARGE
Start: 2020-04-01

## 2020-04-01 RX ORDER — ALBUTEROL 90 UG/1
1 AEROSOL, METERED ORAL
Qty: 1 | Refills: 0
Start: 2020-04-01

## 2020-04-01 RX ORDER — GUAIFENESIN/DEXTROMETHORPHAN 600MG-30MG
10 TABLET, EXTENDED RELEASE 12 HR ORAL EVERY 6 HOURS
Refills: 0 | Status: DISCONTINUED | OUTPATIENT
Start: 2020-04-01 | End: 2020-04-01

## 2020-04-01 RX ADMIN — Medication 1 SPRAY(S): at 00:08

## 2020-04-01 RX ADMIN — Medication 10 MILLILITER(S): at 12:27

## 2020-04-01 RX ADMIN — MIDODRINE HYDROCHLORIDE 5 MILLIGRAM(S): 2.5 TABLET ORAL at 05:23

## 2020-04-01 RX ADMIN — Medication 200 MILLIGRAM(S): at 00:28

## 2020-04-01 NOTE — PROGRESS NOTE ADULT - SUBJECTIVE AND OBJECTIVE BOX
Patient is a 54y old  Male who presents with a chief complaint of Fevers and Cough (01 Apr 2020 12:39)      SUBJECTIVE / OVERNIGHT EVENTS:  Patient dry cough.  No cp, abdominal pain , N/V/D    MEDICATIONS  (STANDING):  enoxaparin Injectable 40 milliGRAM(s) SubCutaneous daily  influenza   Vaccine 0.5 milliLiter(s) IntraMuscular once    MEDICATIONS  (PRN):  acetaminophen   Tablet .. 650 milliGRAM(s) Oral every 6 hours PRN Temp greater or equal to 38.5C (101.3F), Mild Pain (1 - 3), Moderate Pain (4 - 6)  ALBUTerol    90 MICROgram(s) HFA Inhaler 1 Puff(s) Inhalation every 6 hours PRN Shortness of Breath  guaifenesin/dextromethorphan  Syrup 10 milliLiter(s) Oral every 6 hours PRN Cough  morphine  - Injectable 1 milliGRAM(s) IV Push every 4 hours PRN dyspnea  sodium chloride 0.65% Nasal 1 Spray(s) Both Nostrils five times a day PRN Nasal Congestion      Vital Signs Last 24 Hrs  T(C): 36.6 (01 Apr 2020 14:33), Max: 37.1 (31 Mar 2020 17:15)  T(F): 97.9 (01 Apr 2020 14:33), Max: 98.8 (31 Mar 2020 22:46)  HR: 80 (01 Apr 2020 14:33) (68 - 90)  BP: 104/59 (01 Apr 2020 14:33) (91/56 - 105/60)  BP(mean): --  RR: 18 (01 Apr 2020 14:33) (18 - 18)  SpO2: 95% (01 Apr 2020 14:33) (94% - 98%)  CAPILLARY BLOOD GLUCOSE        I&O's Summary    31 Mar 2020 07:01  -  01 Apr 2020 07:00  --------------------------------------------------------  IN: 750 mL / OUT: 1000 mL / NET: -250 mL        PHYSICAL EXAM:  GENERAL: NAD, well-developed  EYES: EOMI, PERRLA, conjunctiva and sclera clear  CHEST/LUNG: Clear to auscultation bilaterally; No wheeze  HEART: Regular rate and rhythm; No murmurs, rubs, or gallops  ABDOMEN: Soft, Nontender, Nondistended; Bowel sounds present  EXTREMITIES:  2+ Peripheral Pulses, No clubbing, cyanosis, or edema  PSYCH: AAOx3  NEUROLOGY: non-focal  SKIN: No rashes or lesions    LABS:                        12.4   8.20  )-----------( 561      ( 01 Apr 2020 07:40 )             36.4     04-01    134<L>  |  100  |  15  ----------------------------<  97  4.9   |  19<L>  |  0.64    Ca    9.0      01 Apr 2020 07:40  Phos  3.8     03-31  Mg     2.4     04-01    TPro  8.0  /  Alb  3.2<L>  /  TBili  0.5  /  DBili  x   /  AST  81<H>  /  ALT  205<H>  /  AlkPhos  123<H>  03-31                    RADIOLOGY & ADDITIONAL TESTS:    Imaging Personally Reviewed:    Consultant(s) Notes Reviewed:      Care Discussed with Consultants/Other Providers:

## 2020-04-01 NOTE — PROGRESS NOTE ADULT - PROBLEM SELECTOR PLAN 6
- Pt is full code.  Pt is aware that his wife is currently intubated in the ICU and is critically ill.  Pt informed that daughter is doing well.   - Pt has appointed his eldest son (varun) as primary decision maker and his sister in law Demetria (numbers in chart).  - Palliative care also following closely
-mild  -likely related to poor PO intake  -hold off on IV fluids given COVID infection and hypoxia  -continue to monitor

## 2020-04-01 NOTE — DISCHARGE NOTE NURSING/CASE MANAGEMENT/SOCIAL WORK - PATIENT PORTAL LINK FT
You can access the FollowMyHealth Patient Portal offered by [facility] by registering at the following website: [email or web address]. By joining [facility]’s FollowMyHealth portal, you will also be able to view your health information using other applications (apps) compatible with our system.

## 2020-04-01 NOTE — DISCHARGE NOTE PROVIDER - NSDCCPCAREPLAN_GEN_ALL_CORE_FT
PRINCIPAL DISCHARGE DIAGNOSIS  Diagnosis: Infection due to 2019 novel coronavirus  Assessment and Plan of Treatment: You have been diagnosed with the COVID-19 virus during your hospital stay. You must self quarantine to complete a 14 day time period.  Monitor for fevers, shortness of breath and cough primarily.  Monitor your temperature daily to not any changes and increases.    It has been determined that you no longer need hospitalization and can recover while remaining in self-quarantine at home. You should follow the prevention steps below until a healthcare provider or local or state health department says you can return to your normal activities.  1. You should restrict activities outside your home, except for getting medical care.  2. Do not go to work, school, or public areas.  3. Avoid using public transportation, ride-sharing, or taxis.  4. Separate yourself from other people and animals in your home.  5. Call ahead before visiting your doctor.  6. Wear a facemask.  7. Cover your coughs and sneezes.  8. Clean your hands often.  9. Avoid sharing personal household items.  10. Clean all “high-touch” surfaces everyday.  11. Monitor your symptoms.  If you have a medical emergency and need to call 911, notify the dispatch personnel that you have COVID-19 If possible, put on a facemask before emergency medical services arrive.  12. Stopping home isolation.  Patients with confirmed COVID-19 should remain under home isolation precautions for 14 days since the positive COVID-19 test and until the risk of secondary transmission to others is thought to be low. The decision to discontinue home isolation precautions should be made on a case-by-case basis, in consultation with healthcare providers and state and local health departments. Your [facility] Department of Health can be reached at [phone] for further information about COVID-19. PRINCIPAL DISCHARGE DIAGNOSIS  Diagnosis: Infection due to 2019 novel coronavirus  Assessment and Plan of Treatment: You have been diagnosed with the COVID-19 virus during your hospital stay. Separate yourself from other household members and stay on home isolation until 3 things have happened:  1. At least 7 days have passed since symptoms first appeared or first positive test.  2. You have been fever-free for 72 hours (3 days) without use of medication (such as Tylenol)  3. Other symptomst have improved, such as cough or shortness of breath.   It has been determined that you no longer need hospitalization and can recover while remaining in self-quarantine at home. You should follow the prevention steps below until a healthcare provider or local or state health department says you can return to your normal activities.  1. You should restrict activities outside your home, except for getting medical care.  2. Do not go to work, school, or public areas.  3. Avoid using public transportation, ride-sharing, or taxis.  4. Separate yourself from other people and animals in your home.  5. Call ahead before visiting your doctor.  6. Wear a facemask.  7. Cover your coughs and sneezes.  8. Clean your hands often.  9. Avoid sharing personal household items.  10. Clean all “high-touch” surfaces everyday.  11. Monitor your symptoms.  If you have a medical emergency and need to call 911, notify the dispatch personnel that you have COVID-19 If possible, put on a facemask before emergency medical services arrive.  12. Stopping home isolation.  Patients with confirmed COVID-19 should remain under home isolation precautions for 7 days since the positive COVID-19 test and until the risk of secondary transmission to others is thought to be low. The decision to discontinue home isolation precautions should be made on a case-by-case basis, in consultation with healthcare providers and state and local health departments. Your [facility] Department of Health can be reached at [phone] for further information about COVID-19.

## 2020-04-01 NOTE — PROGRESS NOTE ADULT - REASON FOR ADMISSION
Fevers and Cough

## 2020-04-01 NOTE — DISCHARGE NOTE PROVIDER - HOSPITAL COURSE
54 y.o. Male w/ Hx Depression a/w severe sepsis secondary to COVID19 and acute hypoxic respiratory failure.        1) Sepsis     -Patient presented with tachypnea and fevers 2/2 COVID19. Pt now afebrile for past 72 hours    - Started hydroxychloroquine 3/23 given escalating O2 requirements, repeat EKG with QTc 420 (3/25)     - Blood cultures x2 negative    - UA/RVP negative, CXR with bilateral patchy opacities consistent with COVID 19 infection.          2) Upper respiratory infection    - COVID19 positive     - Albuterol inhaler prn for shortness of breath    - Avoid nebulizing treatments as this can aerosolize infection    - RVP negative    - Continue to wean off oxygen as tolerated    - Continue trending procalcitonin, CRP, ESR and Ferritin q48-72 hours for prognostication (next due 3/28)     - Continue PRN IV morphine for respiratory distress, pt finds much benefit with this medication.         3) Hypotension    -Decrease midodrine to 5mg tid.         4) Transaminitis    -not present on admission    -could be secondary to viral infection vs hypotension    -continue to monitor and avoid hepatotoxins.         5) Hyponatremia     -mild likely related to poor PO intake    -hold off on IV fluids given COVID infection and hypoxia    -continue to monitor.          6) Advanced care planning/counseling discussion     - Pt is full code.  Pt is aware that his wife is currently intubated in the ICU and is critically ill.  Pt informed that daughter is doing well.     - Pt has appointed his eldest son (Jaren) as primary decision maker and his sister in law Demetria (numbers in chart).    - Palliative care also following closely.         7) Need for prophylactic measure    -Diet: Regular    -Ambulate as tolerated            Have discussed this case with the attending. Patient is stable and clear for discharge         Electronic Signatures: 54 y.o. Male w/ Hx Depression a/w severe sepsis secondary to COVID19 and acute hypoxic respiratory failure.        1) Sepsis     -Patient presented with tachypnea and fevers 2/2 COVID19. Pt now afebrile for past 72 hours    - Started hydroxychloroquine 3/23 given escalating O2 requirements, repeat EKG with QTc 420 (3/25)     - Blood cultures x2 negative    - UA/RVP negative, CXR with bilateral patchy opacities consistent with COVID 19 infection.          2) Upper respiratory infection    - COVID19 positive     - Albuterol inhaler prn for shortness of breath    - Avoid nebulizing treatments as this can aerosolize infection    - RVP negative    - Continue to wean off oxygen as tolerated    - Continue trending procalcitonin, CRP, ESR and Ferritin q48-72 hours for prognostication (next due 3/28)     - Continue PRN IV morphine for respiratory distress, pt finds much benefit with this medication.         3) Hypotension    -Decrease midodrine to 5mg tid.         4) Transaminitis    -not present on admission    -could be secondary to viral infection vs hypotension    -continue to monitor and avoid hepatotoxins.         5) Hyponatremia     -mild likely related to poor PO intake    -hold off on IV fluids given COVID infection and hypoxia    -continue to monitor.          6) Advanced care planning/counseling discussion     - Pt is full code.  Pt is aware that his wife is currently intubated in the ICU and is critically ill.  Pt informed that daughter is doing well, She was discharged on 3/31    - Pt has appointed his eldest son (Jaren) as primary decision maker and his sister in law Demetria (numbers in chart).    - Palliative care also following closely.         7) Need for prophylactic measure    -Diet: Regular    -Ambulate as tolerated                        Patient has been on Room Air with O2 Saturation of 94%.    This case was discussed with MD and patient has been clear for discharge.            Have discussed this case with the attending. Patient is stable and clear for discharge         Electronic Signatures: 54 y.o. Male w/ Hx Depression a/w severe sepsis secondary to COVID19 and acute hypoxic respiratory failure.        1) Sepsis     -Patient presented with tachypnea and fevers 2/2 COVID19. Pt now afebrile for past 72 hours    - Completed hydroxychloroquine      - Blood cultures x2 negative    - UA/RVP negative, CXR with bilateral patchy opacities consistent with COVID 19 infection.         2) COVID-19 infection    - Albuterol inhaler prn for shortness of breath    - Avoid nebulizing treatments as this can aerosolize infection    - RVP negative    - Weaned off oxygen - patient currently satting 95% on RA at rest and 92% on RA while ambulating        3) Hypotension    - s/p Midodrine - weaned off    - BP stable         4) Transaminitis    -not present on admission    -could be secondary to viral infection vs hypotension    -continue to monitor and avoid hepatotoxins.         5) Hyponatremia     -mild likely related to poor PO intake    -hold off on IV fluids given COVID infection and hypoxia    -continue to monitor.         Case discussed with Dr. Lal and patient is medically stable for discharge.

## 2020-04-01 NOTE — PROGRESS NOTE ADULT - PROBLEM SELECTOR PROBLEM 7
Need for prophylactic measure
Advanced care planning/counseling discussion

## 2020-04-01 NOTE — PROGRESS NOTE ADULT - PROBLEM SELECTOR PLAN 7
Diet: Regular  DVT Prophylaxis: Lovenox daily for DVT ppx as pt nonambulatory and elevated risk of VTE COVID pts  Dispo: Pending resolution of symptoms
- Pt is full code.  Pt is aware that his wife is currently intubated in the ICU and is critically ill.  Pt informed that daughter is doing well.   - Pt has appointed his eldest son (varun) as primary decision maker and his sister in law Demetria (numbers in chart).  - Palliative care also following closely

## 2020-04-01 NOTE — DISCHARGE NOTE PROVIDER - NSDCMRMEDTOKEN_GEN_ALL_CORE_FT
acetaminophen 325 mg oral tablet: 2 tab(s) orally every 6 hours, As needed, Temp greater or equal to 38.5C (101.3F), Mild Pain (1 - 3), Moderate Pain (4 - 6)  guaifenesin-dextromethorphan 100 mg-10 mg/5 mL oral liquid: 10 milliliter(s) orally every 6 hours, As needed, Cough albuterol 90 mcg/inh inhalation aerosol: 1 puff(s) inhaled every 6 hours, As needed, Shortness of Breath  guaifenesin-dextromethorphan 100 mg-10 mg/5 mL oral liquid: 10 milliliter(s) orally every 6 hours, As needed, Cough  Tylenol Extra Strength 500 mg oral tablet: 1 tab(s) orally every 8 hours

## 2020-04-01 NOTE — PROGRESS NOTE ADULT - ASSESSMENT
54 y.o. Male w/ Hx Depression a/w severe sepsis secondary to COVID19 and acute hypoxic respiratory failure. Patient now not hypoxic. d/c planning. d/c 40 minutes.

## 2020-04-01 NOTE — PROGRESS NOTE ADULT - PROBLEM SELECTOR PROBLEM 6
Advanced care planning/counseling discussion
Hyponatremia

## 2020-07-22 ENCOUNTER — TRANSCRIPTION ENCOUNTER (OUTPATIENT)
Age: 55
End: 2020-07-22

## 2021-10-04 NOTE — PATIENT PROFILE ADULT - NSPROPOAURINARYCATHETER_GEN_A_NUR
POSTERIOR MOLD CARE      CARE  · Keep your mold dry  · If you have a leg or foot mold, do NOT stand on it (use crutches)  · Rest limb as often as possible  · Keep limb elevated as often as possible to minimize swelling  · Ice can be applied over the mold in a waterproof bag or pack  · Watch for any of the following:  · Numbness or tingling  · Increased swelling  · Color change or coolness in body part beyond post-mold  · Check capillary refill every 2 hours while awake (press nail bed until in blanches white, release and watch for pinkness color to return.  This should take less than 2 seconds for the pink color to return.)  · You may loosen the ace wrap if necessary to relieve the above signs and symptoms    CALL DOCTOR IF:  · Pain is not relieved by loosened ace wrap and/or elevating extremity above heart level  · New or more severe symptoms  · Prolonged capillary refill not relieved by loosened wrap  · If the posterior mold gets wet    FOLLOW UP CARE:  It is important for you to follow-up as recommended with an Orthopaedic provider. Please call the Orthopaedic department at [phone].  Inform the  that you were seen in Immediate Care, diagnosed with an elbow fracture, and a posterior mold has been applied to begin treatment for your injury.  At times, the Orthopaedic provider prefers to have you remain in the posterior mold for a few days before you are seen, as it accommodates for changes in swelling while providing immobilization.      If you have any other problems or concerns please feel free to contact Immediate Care at:  [facility] [phone]   [facility] [phone]   [place]           [phone]       Thank you for choosing [facility] for your care.       no
